# Patient Record
Sex: FEMALE | Race: WHITE | NOT HISPANIC OR LATINO | Employment: OTHER | URBAN - METROPOLITAN AREA
[De-identification: names, ages, dates, MRNs, and addresses within clinical notes are randomized per-mention and may not be internally consistent; named-entity substitution may affect disease eponyms.]

---

## 2017-01-16 ENCOUNTER — APPOINTMENT (OUTPATIENT)
Dept: CARDIOLOGY | Facility: HOSPITAL | Age: 62
End: 2017-01-16
Attending: RADIOLOGY

## 2021-01-27 DIAGNOSIS — I65.21 STENOSIS OF RIGHT CAROTID ARTERY: Primary | ICD-10-CM

## 2021-01-27 RX ORDER — ALIROCUMAB 75 MG/ML
75 INJECTION, SOLUTION SUBCUTANEOUS
Qty: 6 ML | Refills: 3 | Status: SHIPPED | OUTPATIENT
Start: 2021-01-27 | End: 2022-03-25

## 2021-01-27 NOTE — TELEPHONE ENCOUNTER
Dr. Scott:     Last Office Visit: 07/02/2020  Last Labs: 01/09/2020  Next Lab Visit: -   Next Office Visit: -      :  Please book patient for a follow up appointment for labs and office visit.

## 2021-03-03 DIAGNOSIS — I65.21 STENOSIS OF RIGHT CAROTID ARTERY: Primary | ICD-10-CM

## 2021-03-04 RX ORDER — FUROSEMIDE 20 MG/1
TABLET ORAL
Qty: 90 TABLET | Refills: 0 | Status: SHIPPED | OUTPATIENT
Start: 2021-03-04 | End: 2021-04-14

## 2021-03-09 DIAGNOSIS — I10 ESSENTIAL HYPERTENSION, BENIGN: Primary | ICD-10-CM

## 2021-03-09 DIAGNOSIS — E78.2 MIXED HYPERLIPIDEMIA: ICD-10-CM

## 2021-03-09 RX ORDER — AMLODIPINE BESYLATE 5 MG/1
5 TABLET ORAL DAILY
Qty: 90 TABLET | Refills: 0 | Status: SHIPPED | OUTPATIENT
Start: 2021-03-09 | End: 2021-04-14 | Stop reason: SDUPTHER

## 2021-03-09 RX ORDER — ROSUVASTATIN CALCIUM 20 MG/1
20 TABLET, COATED ORAL DAILY
Qty: 90 TABLET | Refills: 0 | Status: SHIPPED | OUTPATIENT
Start: 2021-03-09 | End: 2021-04-14 | Stop reason: SDUPTHER

## 2021-03-09 RX ORDER — ATENOLOL 50 MG/1
50 TABLET ORAL DAILY
Qty: 90 TABLET | Refills: 0 | Status: SHIPPED | OUTPATIENT
Start: 2021-03-09 | End: 2021-04-14 | Stop reason: SDUPTHER

## 2021-04-07 ENCOUNTER — TELEPHONE (OUTPATIENT)
Dept: CARDIOLOGY | Facility: CLINIC | Age: 66
End: 2021-04-07

## 2021-04-07 DIAGNOSIS — I65.23 BILATERAL CAROTID ARTERY STENOSIS: Primary | ICD-10-CM

## 2021-04-07 NOTE — TELEPHONE ENCOUNTER
PATIENT CALLING ASKING ABOUT REFILLS FOR HER LASIK THAT WE ARE DENYING    AND HER BLOOD THINNER    [phone]

## 2021-04-08 RX ORDER — FUROSEMIDE 20 MG/1
20 TABLET ORAL DAILY
Qty: 90 TABLET | Refills: 3 | Status: SHIPPED | OUTPATIENT
Start: 2021-04-08 | End: 2021-09-30

## 2021-04-09 ENCOUNTER — TELEPHONE (OUTPATIENT)
Dept: CARDIOLOGY | Facility: CLINIC | Age: 66
End: 2021-04-09

## 2021-04-14 ENCOUNTER — OFFICE VISIT (OUTPATIENT)
Dept: CARDIOLOGY | Facility: CLINIC | Age: 66
End: 2021-04-14

## 2021-04-14 VITALS
HEIGHT: 63 IN | DIASTOLIC BLOOD PRESSURE: 70 MMHG | SYSTOLIC BLOOD PRESSURE: 132 MMHG | WEIGHT: 180 LBS | BODY MASS INDEX: 31.89 KG/M2

## 2021-04-14 DIAGNOSIS — I65.23 BILATERAL CAROTID ARTERY STENOSIS: ICD-10-CM

## 2021-04-14 DIAGNOSIS — E78.2 MIXED HYPERLIPIDEMIA: ICD-10-CM

## 2021-04-14 DIAGNOSIS — I10 ESSENTIAL HYPERTENSION, BENIGN: ICD-10-CM

## 2021-04-14 PROCEDURE — 99442 PR PHYS/QHP TELEPHONE EVALUATION 11-20 MIN: CPT | Performed by: PHYSICIAN ASSISTANT

## 2021-04-14 RX ORDER — ROSUVASTATIN CALCIUM 20 MG/1
20 TABLET, COATED ORAL DAILY
Qty: 90 TABLET | Refills: 0 | Status: SHIPPED | OUTPATIENT
Start: 2021-04-14 | End: 2021-10-06 | Stop reason: SDUPTHER

## 2021-04-14 RX ORDER — ATENOLOL 50 MG/1
50 TABLET ORAL DAILY
Qty: 90 TABLET | Refills: 0 | Status: SHIPPED | OUTPATIENT
Start: 2021-04-14 | End: 2021-10-06 | Stop reason: SDUPTHER

## 2021-04-14 RX ORDER — AMLODIPINE BESYLATE 5 MG/1
5 TABLET ORAL DAILY
Qty: 90 TABLET | Refills: 0 | Status: SHIPPED | OUTPATIENT
Start: 2021-04-14 | End: 2021-09-27 | Stop reason: SDUPTHER

## 2021-04-14 RX ORDER — LISINOPRIL 40 MG/1
40 TABLET ORAL NIGHTLY
Qty: 180 TABLET | Refills: 3 | Status: SHIPPED | OUTPATIENT
Start: 2021-04-14 | End: 2022-05-20

## 2021-04-14 RX ORDER — OMEPRAZOLE 40 MG/1
40 CAPSULE, DELAYED RELEASE ORAL DAILY
COMMUNITY
Start: 2021-03-09

## 2021-04-14 NOTE — PROGRESS NOTES
[facility] [place]  [facility] CARDIOLOGY  [address] [place] [address]  Dept: [phone]  Dept Fax: [phone]    Eloise Cheng  1955    Televisit Note    You have chosen to receive care through a telephone visit. Do you consent to use a telephone visit for your medical care today? Yes    History of Present Illness:  Eloise Chneg is a 65 y.o. female who presents via phone for follow-up for coronary artery disease, hypertension, and hyperlipidemia.  Patient currently taking Xarelto 2.5 mg twice daily and aspirin 81 mg daily.  Patient denies any chest pain, shortness of breath, leg swelling, syncope, or near syncopal episodes.  Patient currently taking atenolol 50 mg daily, Lasix 20 mg daily, and lisinopril 40 mg daily.  Patient reports blood pressure normal 130/70 on average.    Patient taking Crestor 20 mg and Praluent for hyperlipidemia.    Patient reports overall doing well and feeling well.    The following portions of the patient's history were reviewed and updated as appropriate: allergies, current medications, past family history, past medical history, past social history, past surgical history and problem list.    This visit was scheduled as a phone visit to comply with patient safety concerns in accordance with CDC recommendations.  Time spent in discussion with the patient was 20 minutes.     Medications  amLODIPine  aspirin  atenolol  baclofen  furosemide  lisinopril  Nitrostat sublingual tablet  omeprazole  Praluent solution auto-injector  Rivaroxaban  rosuvastatin    Subjective  Allergies   Allergen Reactions   • Cephalexin GI Intolerance   • Codeine GI Intolerance   • Prednisone GI Intolerance   • Tape Other (See Comments)     Paper tape / water welps        Past Medical History:   Diagnosis Date   • Abdominal aortic aneurysm (AAA) (CMS/[facility]) 2010   • Cancer (CMS/[facility]) 1994    cervix   • Carotid artery stenosis    • Chest pain    • Chronic back pain    • Coarctation  of infrarenal abdominal aorta    • Coronary artery disease    • Depression    • Diabetes (CMS/HCC)    • Disease of artery (CMS/HCC)    • Disease of thyroid gland    • Dyslipidemia    • GERD (gastroesophageal reflux disease)    • Hepatic steatosis    • Hiatal hernia    • Hyperlipidemia    • Hypertension    • Obesity        Past Surgical History:   Procedure Laterality Date   • AORTAGRAM  02/2014   • ARTERIAL ANEURYSM REPAIR  2014    AAA endograft   • BREAST MASS EXCISION  2003   • CAROTID ENDARTERECTOMY Right 07/2012   • COLONOSCOPY N/A    • OR TCAT IV STENT CRV CRTD ART EMBOLIC PROTECJ N/A 6/16/2016    Left Carotid Stent;  Surgeon: Tony Fry MD;  Location: [facility]   • SHOULDER SURGERY      clavicular fx repair   • TONSILLECTOMY  1966   • TUBAL ABDOMINAL LIGATION Bilateral        Family History   Problem Relation Age of Onset   • Coronary artery disease Mother 42   • Hypertension Father    • Coronary artery disease Father    • Hypertension Brother    • Coronary artery disease Brother         CABG   • Diabetes Other    • Suicidality Other         Social History     Socioeconomic History   • Marital status:      Spouse name: Not on file   • Number of children: Not on file   • Years of education: Not on file   • Highest education level: Not on file   Tobacco Use   • Smoking status: Former Smoker   • Smokeless tobacco: Never Used   • Tobacco comment: quit 8 years ago   Substance and Sexual Activity   • Alcohol use: No   • Drug use: No       Cardiovascular Procedures  CATH LAB:  Cath ([facility] ..patent coronaries.. unilateral carotid artery disease which is favorable for carotid  endarterectomy.. normal left ventricular systolic function) - 7/20/2012  CV/SURGERY:  CV Surgery (right CEA) - 7/2012  STRESS TESTS:  MPI (Nuclear stress study was clinically and electrically negative, scintigraphically equivocal with an apical fixed  defect which was worse at rest as well as an inferior perfusion defect at  "rest only. Those were likely artifactual.  There were no high risk features.) - 9/24/2015  VASCULAR:  Carotid Duplex (Carotids CVA: 3/11/2019: Chronic occlusion of the right internal carotid artery. The left carotid  is patent at the site of previous stent placement.)  OTHERS:  Abdominal ultrasound (Abdominal ultrasound That colon 1/2/2019: Mild ectasia of the abdominal aorta. No  significant aneurysm.) - 1/2/2019    Objective  Vitals:    04/14/21 1035   BP: 132/70   Weight: 81.6 kg (180 lb)   Height: 160 cm (63\")   PainSc: 0-No pain     Body mass index is 31.89 kg/m².    Assessment  Diagnoses and all orders for this visit:    1. Essential hypertension, benign  -     amLODIPine (NORVASC) 5 MG tablet; Take 1 tablet by mouth Daily.  Dispense: 90 tablet; Refill: 0  -     atenolol (TENORMIN) 50 MG tablet; Take 1 tablet by mouth Daily.  Dispense: 90 tablet; Refill: 0  -     lisinopril (PRINIVIL,ZESTRIL) 40 MG tablet; Take 1 tablet by mouth Every Night.  Dispense: 180 tablet; Refill: 3  -     Comprehensive Metabolic Panel  -     CBC & Differential    2. Mixed hyperlipidemia  -     rosuvastatin (CRESTOR) 20 MG tablet; Take 1 tablet by mouth Daily.  Dispense: 90 tablet; Refill: 0  -     Lipid Panel    3. Bilateral carotid artery stenosis  -     Rivaroxaban (Xarelto) 2.5 MG tablet; Take 1 tablet by mouth 2 (Two) Times a Day.  Dispense: 180 tablet; Refill: 3        Plan    Diagnoses and all orders for this visit:    1. Essential hypertension, benign- well controlled, refilled meds, cont current tx, cont to monitor.    2. Mixed hyperlipidemia- refilled med, repeat lipids.    3. Bilateral carotid artery stenosis- pt has stent to L carotid. Cont to monitor.      Return in about 6 months (around 10/14/2021).    Antonio Zaragoza PA-C  04/14/2021  "

## 2021-04-14 NOTE — PATIENT INSTRUCTIONS
"Hypertension, Adult  High blood pressure (hypertension) is when the force of blood pumping through the arteries is too strong. The arteries are the blood vessels that carry blood from the heart throughout the body. Hypertension forces the heart to work harder to pump blood and may cause arteries to become narrow or stiff. Untreated or uncontrolled hypertension can cause a heart attack, heart failure, a stroke, kidney disease, and other problems.  A blood pressure reading consists of a higher number over a lower number. Ideally, your blood pressure should be below 120/80. The first (\"top\") number is called the systolic pressure. It is a measure of the pressure in your arteries as your heart beats. The second (\"bottom\") number is called the diastolic pressure. It is a measure of the pressure in your arteries as the heart relaxes.  What are the causes?  The exact cause of this condition is not known. There are some conditions that result in or are related to high blood pressure.  What increases the risk?  Some risk factors for high blood pressure are under your control. The following factors may make you more likely to develop this condition:  · Smoking.  · Having type 2 diabetes mellitus, high cholesterol, or both.  · Not getting enough exercise or physical activity.  · Being overweight.  · Having too much fat, sugar, calories, or salt (sodium) in your diet.  · Drinking too much alcohol.  Some risk factors for high blood pressure may be difficult or impossible to change. Some of these factors include:  · Having chronic kidney disease.  · Having a family history of high blood pressure.  · Age. Risk increases with age.  · Race. You may be at higher risk if you are .  · Gender. Men are at higher risk than women before age 45. After age 65, women are at higher risk than men.  · Having obstructive sleep apnea.  · Stress.  What are the signs or symptoms?  High blood pressure may not cause symptoms. Very high " blood pressure (hypertensive crisis) may cause:  · Headache.  · Anxiety.  · Shortness of breath.  · Nosebleed.  · Nausea and vomiting.  · Vision changes.  · Severe chest pain.  · Seizures.  How is this diagnosed?  This condition is diagnosed by measuring your blood pressure while you are seated, with your arm resting on a flat surface, your legs uncrossed, and your feet flat on the floor. The cuff of the blood pressure monitor will be placed directly against the skin of your upper arm at the level of your heart. It should be measured at least twice using the same arm. Certain conditions can cause a difference in blood pressure between your right and left arms.  Certain factors can cause blood pressure readings to be lower or higher than normal for a short period of time:  · When your blood pressure is higher when you are in a health care provider's office than when you are at home, this is called white coat hypertension. Most people with this condition do not need medicines.  · When your blood pressure is higher at home than when you are in a health care provider's office, this is called masked hypertension. Most people with this condition may need medicines to control blood pressure.  If you have a high blood pressure reading during one visit or you have normal blood pressure with other risk factors, you may be asked to:  · Return on a different day to have your blood pressure checked again.  · Monitor your blood pressure at home for 1 week or longer.  If you are diagnosed with hypertension, you may have other blood or imaging tests to help your health care provider understand your overall risk for other conditions.  How is this treated?  This condition is treated by making healthy lifestyle changes, such as eating healthy foods, exercising more, and reducing your alcohol intake. Your health care provider may prescribe medicine if lifestyle changes are not enough to get your blood pressure under control, and  if:  · Your systolic blood pressure is above 130.  · Your diastolic blood pressure is above 80.  Your personal target blood pressure may vary depending on your medical conditions, your age, and other factors.  Follow these instructions at home:  Eating and drinking    · Eat a diet that is high in fiber and potassium, and low in sodium, added sugar, and fat. An example eating plan is called the DASH (Dietary Approaches to Stop Hypertension) diet. To eat this way:  ? Eat plenty of fresh fruits and vegetables. Try to fill one half of your plate at each meal with fruits and vegetables.  ? Eat whole grains, such as whole-wheat pasta, brown rice, or whole-grain bread. Fill about one fourth of your plate with whole grains.  ? Eat or drink low-fat dairy products, such as skim milk or low-fat yogurt.  ? Avoid fatty cuts of meat, processed or cured meats, and poultry with skin. Fill about one fourth of your plate with lean proteins, such as fish, chicken without skin, beans, eggs, or tofu.  ? Avoid pre-made and processed foods. These tend to be higher in sodium, added sugar, and fat.  · Reduce your daily sodium intake. Most people with hypertension should eat less than 1,500 mg of sodium a day.  · Do not drink alcohol if:  ? Your health care provider tells you not to drink.  ? You are pregnant, may be pregnant, or are planning to become pregnant.  · If you drink alcohol:  ? Limit how much you use to:  § 0-1 drink a day for women.  § 0-2 drinks a day for men.  ? Be aware of how much alcohol is in your drink. In the [place] one drink equals one 12 oz bottle of beer (355 mL), one 5 oz glass of wine (148 mL), or one 1½ oz glass of hard liquor (44 mL).  Lifestyle    · Work with your health care provider to maintain a healthy body weight or to lose weight. Ask what an ideal weight is for you.  · Get at least 30 minutes of exercise most days of the week. Activities may include walking, swimming, or biking.  · Include exercise to  strengthen your muscles (resistance exercise), such as Pilates or lifting weights, as part of your weekly exercise routine. Try to do these types of exercises for 30 minutes at least 3 days a week.  · Do not use any products that contain nicotine or tobacco, such as cigarettes, e-cigarettes, and chewing tobacco. If you need help quitting, ask your health care provider.  · Monitor your blood pressure at home as told by your health care provider.  · Keep all follow-up visits as told by your health care provider. This is important.  Medicines  · Take over-the-counter and prescription medicines only as told by your health care provider. Follow directions carefully. Blood pressure medicines must be taken as prescribed.  · Do not skip doses of blood pressure medicine. Doing this puts you at risk for problems and can make the medicine less effective.  · Ask your health care provider about side effects or reactions to medicines that you should watch for.  Contact a health care provider if you:  · Think you are having a reaction to a medicine you are taking.  · Have headaches that keep coming back (recurring).  · Feel dizzy.  · Have swelling in your ankles.  · Have trouble with your vision.  Get help right away if you:  · Develop a severe headache or confusion.  · Have unusual weakness or numbness.  · Feel faint.  · Have severe pain in your chest or abdomen.  · Vomit repeatedly.  · Have trouble breathing.  Summary  · Hypertension is when the force of blood pumping through your arteries is too strong. If this condition is not controlled, it may put you at risk for serious complications.  · Your personal target blood pressure may vary depending on your medical conditions, your age, and other factors. For most people, a normal blood pressure is less than 120/80.  · Hypertension is treated with lifestyle changes, medicines, or a combination of both. Lifestyle changes include losing weight, eating a healthy, low-sodium diet,  exercising more, and limiting alcohol.  This information is not intended to replace advice given to you by your health care provider. Make sure you discuss any questions you have with your health care provider.  Document Revised: 08/28/2019 Document Reviewed: 08/28/2019  Elsevier Patient Education © 2021 [employer]

## 2021-09-27 ENCOUNTER — TELEPHONE (OUTPATIENT)
Dept: CARDIOLOGY | Facility: CLINIC | Age: 66
End: 2021-09-27

## 2021-09-27 DIAGNOSIS — I10 ESSENTIAL HYPERTENSION, BENIGN: ICD-10-CM

## 2021-09-27 RX ORDER — AMLODIPINE BESYLATE 5 MG/1
5 TABLET ORAL DAILY
Qty: 30 TABLET | Refills: 0 | Status: SHIPPED | OUTPATIENT
Start: 2021-09-27 | End: 2021-09-30

## 2021-09-27 NOTE — TELEPHONE ENCOUNTER
Please advise...    Was in hosp for covid and they took her off her blood pressure      What should she do her heart rate has been jumping

## 2021-09-27 NOTE — TELEPHONE ENCOUNTER
Was in hosp for covid and they took her off her blood pressure     What should she do her heart rate has been jumping

## 2021-09-28 ENCOUNTER — TELEPHONE (OUTPATIENT)
Dept: CARDIOLOGY | Facility: CLINIC | Age: 66
End: 2021-09-28

## 2021-09-28 NOTE — TELEPHONE ENCOUNTER
Pt says he HR earlier was 107 but she was moving around, says she sat down and her HR went down to 88 she also says her bp has been around 112//60

## 2021-09-30 ENCOUNTER — OFFICE VISIT (OUTPATIENT)
Dept: CARDIOLOGY | Facility: CLINIC | Age: 66
End: 2021-09-30

## 2021-09-30 VITALS
RESPIRATION RATE: 15 BRPM | WEIGHT: 210 LBS | HEART RATE: 53 BPM | SYSTOLIC BLOOD PRESSURE: 96 MMHG | BODY MASS INDEX: 37.21 KG/M2 | OXYGEN SATURATION: 95 % | DIASTOLIC BLOOD PRESSURE: 64 MMHG | HEIGHT: 63 IN | TEMPERATURE: 97.1 F

## 2021-09-30 DIAGNOSIS — E78.5 HYPERLIPIDEMIA LDL GOAL <70: ICD-10-CM

## 2021-09-30 DIAGNOSIS — I10 ESSENTIAL HYPERTENSION: ICD-10-CM

## 2021-09-30 DIAGNOSIS — I65.23 CAROTID STENOSIS, BILATERAL: Primary | ICD-10-CM

## 2021-09-30 PROCEDURE — 99214 OFFICE O/P EST MOD 30 MIN: CPT | Performed by: INTERNAL MEDICINE

## 2021-09-30 NOTE — PROGRESS NOTES
[facility] [place]  [facility] CARDIOLOGY  [address] [place] [address]  Dept: [phone]  Dept Fax: [phone]    Eloise Cheng  1955    Follow Up Office Visit Note    History of Present Illness:  Eloise Cheng is a 66 y.o. female who presents to the clinic for Follow-up CAD- Mild disease by cath , no chest pain    The following portions of the patient's history were reviewed and updated as appropriate: allergies, current medications, past family history, past medical history, past social history, past surgical history and problem list.    Medications:  aspirin  atenolol  baclofen  lisinopril  Nitrostat sublingual tablet  omeprazole  Praluent solution auto-injector  Rivaroxaban  rosuvastatin    Subjective  Allergies   Allergen Reactions   • Cephalexin GI Intolerance   • Codeine GI Intolerance   • Prednisone GI Intolerance   • Tape Other (See Comments)     Paper tape / water welps        Past Medical History:   Diagnosis Date   • Abdominal aortic aneurysm (AAA) (CMS/HCC) 2010   • Cancer (CMS/[facility]) 1994    cervix   • Carotid artery stenosis    • Chest pain    • Chronic back pain    • Coarctation of infrarenal abdominal aorta    • Coronary artery disease    • Depression    • Diabetes (CMS/HCC)    • Disease of artery (CMS/HCC)    • Disease of thyroid gland    • Dyslipidemia    • GERD (gastroesophageal reflux disease)    • Hepatic steatosis    • Hiatal hernia    • Hyperlipidemia    • Hypertension    • Obesity        Past Surgical History:   Procedure Laterality Date   • AORTAGRAM  02/2014   • ARTERIAL ANEURYSM REPAIR  2014    AAA endograft   • BREAST MASS EXCISION  2003   • CAROTID ENDARTERECTOMY Right 07/2012   • COLONOSCOPY N/A    • [place] TCAT IV STENT CRV CRTD ART EMBOLIC PROTECJ N/A 6/16/2016    Left Carotid Stent;  Surgeon: Tony Fry MD;  Location: [facility]   • SHOULDER SURGERY      clavicular fx repair   • TONSILLECTOMY  1966   • TUBAL ABDOMINAL LIGATION Bilateral   "      Family History   Problem Relation Age of Onset   • Coronary artery disease Mother 42   • Hypertension Father    • Coronary artery disease Father    • Hypertension Brother    • Coronary artery disease Brother         CABG   • Diabetes Other    • Suicidality Other         Social History     Socioeconomic History   • Marital status:      Spouse name: Not on file   • Number of children: Not on file   • Years of education: Not on file   • Highest education level: Not on file   Tobacco Use   • Smoking status: Former Smoker   • Smokeless tobacco: Never Used   • Tobacco comment: quit 8 years ago   Substance and Sexual Activity   • Alcohol use: No   • Drug use: No       Review of Systems   Constitutional: Negative.    HENT: Negative.    Respiratory: Negative.    Cardiovascular: Negative.    Endocrine: Negative.    Genitourinary: Negative.    Musculoskeletal: Negative.    Skin: Negative.    Allergic/Immunologic: Negative.    Neurological: Negative.    Hematological: Negative.    Psychiatric/Behavioral: Negative.    All other systems reviewed and are negative.      Cardiovascular Procedures    ECHO/MUGA:   STRESS TESTS:   CARDIAC CATH:   DEVICES:   HOLTER:   CT/MRI:   VASCULAR:   CARDIOTHORACIC:     Objective  Vitals:    09/30/21 0944   BP: 96/64   BP Location: Left arm   Patient Position: Sitting   Cuff Size: Large Adult   Pulse: 53   Resp: 15   Temp: 97.1 °F (36.2 °C)   TempSrc: Infrared   SpO2: 95%   Weight: 95.3 kg (210 lb)   Height: 160 cm (63\")   PainSc: 0-No pain     Body mass index is 37.2 kg/m².     Physical Exam  Constitutional:       Appearance: Healthy appearance. Not in distress.   Neck:      Vascular: No JVR. JVD normal.   Pulmonary:      Effort: Pulmonary effort is normal.      Breath sounds: Normal breath sounds. No wheezing. No rhonchi. No rales.   Chest:      Chest wall: Not tender to palpatation.   Cardiovascular:      PMI at left midclavicular line. Normal rate. Regular rhythm. Normal S1. " Normal S2.      Murmurs: There is no murmur.      No gallop. No click. No rub.   Pulses:     Intact distal pulses.   Edema:     Peripheral edema absent.   Abdominal:      General: Bowel sounds are normal.      Palpations: Abdomen is soft.      Tenderness: There is no abdominal tenderness.   Musculoskeletal: Normal range of motion.         General: No tenderness. Skin:     General: Skin is warm and dry.   Neurological:      General: No focal deficit present.      Mental Status: Alert and oriented to person, place and time.          Diagnostic Data  Procedures    Assessment and Plan  Diagnoses and all orders for this visit:    Carotid stenosis, bilateral- patient has prior right carotid endarterectomy      And then has same side 100% occlusion., she is s/p stent to LICA/ 2016.  -     CBC & Differential  -     Comprehensive Metabolic Panel    Essential hypertension- BP has been running low 125.8-0 on Atenolol 50 mg, Lisinopril 40 mg and the amlodipine was d.,c by PCP recently  plus Lasix, will get lab , she has had COVID early this month  -     CBC & Differential  -     Comprehensive Metabolic Panel    Hyperlipidemia LDL goal <70- On Crestor Praluent   -     CBC & Differential  -     Comprehensive Metabolic Panel         No follow-ups on file.    Benson Scott MD  09/30/2021

## 2021-10-01 ENCOUNTER — TELEPHONE (OUTPATIENT)
Dept: CARDIOLOGY | Facility: CLINIC | Age: 66
End: 2021-10-01

## 2021-10-01 LAB
ALBUMIN SERPL-MCNC: 4.3 G/DL (ref 3.8–4.8)
ALBUMIN/GLOB SERPL: 2 {RATIO} (ref 1.2–2.2)
ALP SERPL-CCNC: 79 IU/L (ref 44–121)
ALT SERPL-CCNC: 25 IU/L (ref 0–32)
AST SERPL-CCNC: 25 IU/L (ref 0–40)
BASOPHILS # BLD AUTO: 0.1 X10E3/UL (ref 0–0.2)
BASOPHILS NFR BLD AUTO: 1 %
BILIRUB SERPL-MCNC: 0.5 MG/DL (ref 0–1.2)
BUN SERPL-MCNC: 30 MG/DL (ref 8–27)
BUN/CREAT SERPL: 25 (ref 12–28)
CALCIUM SERPL-MCNC: 9.8 MG/DL (ref 8.7–10.3)
CHLORIDE SERPL-SCNC: 99 MMOL/L (ref 96–106)
CO2 SERPL-SCNC: 23 MMOL/L (ref 20–29)
CREAT SERPL-MCNC: 1.22 MG/DL (ref 0.57–1)
EOSINOPHIL # BLD AUTO: 0.2 X10E3/UL (ref 0–0.4)
EOSINOPHIL NFR BLD AUTO: 2 %
ERYTHROCYTE [DISTWIDTH] IN BLOOD BY AUTOMATED COUNT: 15.8 % (ref 11.7–15.4)
GLOBULIN SER CALC-MCNC: 2.1 G/DL (ref 1.5–4.5)
GLUCOSE SERPL-MCNC: 115 MG/DL (ref 65–99)
HCT VFR BLD AUTO: 43.3 % (ref 34–46.6)
HGB BLD-MCNC: 13.3 G/DL (ref 11.1–15.9)
IMM GRANULOCYTES # BLD AUTO: 0 X10E3/UL (ref 0–0.1)
IMM GRANULOCYTES NFR BLD AUTO: 0 %
LYMPHOCYTES # BLD AUTO: 1.1 X10E3/UL (ref 0.7–3.1)
LYMPHOCYTES NFR BLD AUTO: 11 %
MCH RBC QN AUTO: 24.9 PG (ref 26.6–33)
MCHC RBC AUTO-ENTMCNC: 30.7 G/DL (ref 31.5–35.7)
MCV RBC AUTO: 81 FL (ref 79–97)
MONOCYTES # BLD AUTO: 0.8 X10E3/UL (ref 0.1–0.9)
MONOCYTES NFR BLD AUTO: 8 %
NEUTROPHILS # BLD AUTO: 7.8 X10E3/UL (ref 1.4–7)
NEUTROPHILS NFR BLD AUTO: 78 %
PLATELET # BLD AUTO: 455 X10E3/UL (ref 150–450)
POTASSIUM SERPL-SCNC: 6 MMOL/L (ref 3.5–5.2)
PROT SERPL-MCNC: 6.4 G/DL (ref 6–8.5)
RBC # BLD AUTO: 5.34 X10E6/UL (ref 3.77–5.28)
SODIUM SERPL-SCNC: 136 MMOL/L (ref 134–144)
WBC # BLD AUTO: 10 X10E3/UL (ref 3.4–10.8)

## 2021-10-01 NOTE — TELEPHONE ENCOUNTER
----- Message from Benson Scott MD sent at 10/1/2021  1:08 PM EDT -----  The creatinine is elevated, please stop the Lasix and drink extra fluids, the reason why your BP was low is dehydration, from water pill

## 2021-10-06 ENCOUNTER — TELEPHONE (OUTPATIENT)
Dept: CARDIOLOGY | Facility: CLINIC | Age: 66
End: 2021-10-06

## 2021-10-06 DIAGNOSIS — I10 ESSENTIAL HYPERTENSION, BENIGN: ICD-10-CM

## 2021-10-06 DIAGNOSIS — E78.2 MIXED HYPERLIPIDEMIA: ICD-10-CM

## 2021-10-06 RX ORDER — ATENOLOL 50 MG/1
50 TABLET ORAL DAILY
Qty: 90 TABLET | Refills: 3 | Status: SHIPPED | OUTPATIENT
Start: 2021-10-06 | End: 2022-07-14 | Stop reason: SDUPTHER

## 2021-10-06 RX ORDER — ROSUVASTATIN CALCIUM 20 MG/1
20 TABLET, COATED ORAL DAILY
Qty: 90 TABLET | Refills: 3 | Status: SHIPPED | OUTPATIENT
Start: 2021-10-06 | End: 2022-07-14 | Stop reason: SDUPTHER

## 2021-10-12 ENCOUNTER — TELEPHONE (OUTPATIENT)
Dept: CARDIOLOGY | Facility: CLINIC | Age: 66
End: 2021-10-12

## 2021-10-12 DIAGNOSIS — I10 ESSENTIAL HYPERTENSION, BENIGN: Primary | ICD-10-CM

## 2021-10-13 ENCOUNTER — TELEPHONE (OUTPATIENT)
Dept: CARDIOLOGY | Facility: CLINIC | Age: 66
End: 2021-10-13

## 2021-10-13 NOTE — TELEPHONE ENCOUNTER
Called pt to let her know her lab results from today are normal, she said her bp is running better and shes not feeling dizzy anymore.

## 2022-03-25 DIAGNOSIS — I65.21 STENOSIS OF RIGHT CAROTID ARTERY: ICD-10-CM

## 2022-03-25 RX ORDER — ALIROCUMAB 75 MG/ML
INJECTION, SOLUTION SUBCUTANEOUS
Qty: 6 ML | Refills: 3 | Status: SHIPPED | OUTPATIENT
Start: 2022-03-25 | End: 2023-01-05

## 2022-04-25 ENCOUNTER — OFFICE VISIT (OUTPATIENT)
Dept: CARDIOLOGY | Facility: CLINIC | Age: 67
End: 2022-04-25

## 2022-04-25 VITALS
OXYGEN SATURATION: 99 % | WEIGHT: 218 LBS | RESPIRATION RATE: 18 BRPM | BODY MASS INDEX: 38.62 KG/M2 | HEIGHT: 63 IN | TEMPERATURE: 98 F | DIASTOLIC BLOOD PRESSURE: 84 MMHG | HEART RATE: 90 BPM | SYSTOLIC BLOOD PRESSURE: 152 MMHG

## 2022-04-25 DIAGNOSIS — E78.5 HYPERLIPIDEMIA LDL GOAL <70: ICD-10-CM

## 2022-04-25 DIAGNOSIS — R06.02 SHORTNESS OF BREATH: ICD-10-CM

## 2022-04-25 DIAGNOSIS — I10 ESSENTIAL HYPERTENSION: Primary | ICD-10-CM

## 2022-04-25 DIAGNOSIS — I65.23 CAROTID STENOSIS, BILATERAL: ICD-10-CM

## 2022-04-25 PROCEDURE — 99214 OFFICE O/P EST MOD 30 MIN: CPT | Performed by: INTERNAL MEDICINE

## 2022-04-25 PROCEDURE — 93000 ELECTROCARDIOGRAM COMPLETE: CPT | Performed by: INTERNAL MEDICINE

## 2022-04-25 RX ORDER — AMLODIPINE BESYLATE 5 MG/1
5 TABLET ORAL DAILY
Qty: 90 TABLET | Refills: 3 | Status: SHIPPED | OUTPATIENT
Start: 2022-04-25 | End: 2022-07-14 | Stop reason: SDDI

## 2022-04-25 RX ORDER — FUROSEMIDE 20 MG/1
TABLET ORAL
COMMUNITY
Start: 2022-04-04 | End: 2022-07-05

## 2022-04-25 NOTE — PROGRESS NOTES
[facility] [place]  [facility] CARDIOLOGY  [address] [place] [address]  Dept: [phone]  Dept Fax: [phone]    Eloise Cheng  1955    Follow Up Office Visit Note    History of Present Illness:  Eloise Cheng is a 66 y.o. female who presents to the clinic for Follow-up and Shortness of Breath. -- She has Hypertension, mild CAD, hyperlipidemia and carotid stenosis,  She has noticed some SOB for the last 2 days, her Bp is elevated 160,80 on Atenolol 50 mg Lisinopril 40 mg, has gained 8 pounds, since last visit also she used to take Amlodipine before but her PCP stopped when she has COVID and her BP was low, will restart 5 mg Amlodipine, her EKG  Sinus rhythm Hr 61, no changes ,will restart Amlodipine 5mg, will get some lab BNP,     The following portions of the patient's history were reviewed and updated as appropriate: allergies, current medications, past family history, past medical history, past social history, past surgical history and problem list.    Medications:  amLODIPine  aspirin  atenolol  furosemide  lisinopril  Nitrostat sublingual tablet  omeprazole  Praluent solution auto-injector  Rivaroxaban  rosuvastatin    Subjective  Allergies   Allergen Reactions   • Tape Other (See Comments)     Paper tape / water welps   • Cephalexin GI Intolerance   • Codeine GI Intolerance   • Prednisone GI Intolerance        Past Medical History:   Diagnosis Date   • Abdominal aortic aneurysm (AAA) ([facility]) 2010   • Cancer ([facility]) 1994    cervix   • Carotid artery stenosis    • Chest pain    • Chronic back pain    • Coarctation of infrarenal abdominal aorta    • Coronary artery disease    • Depression    • Diabetes ([facility])    • Disease of artery ([facility])    • Disease of thyroid gland    • Dyslipidemia    • GERD (gastroesophageal reflux disease)    • Hepatic steatosis    • Hiatal hernia    • Hyperlipidemia    • Hypertension    • Obesity        Past Surgical History:   Procedure Laterality Date  "  • AORTAGRAM  2014   • ARTERIAL ANEURYSM REPAIR      AAA endograft   • BREAST MASS EXCISION     • CAROTID ENDARTERECTOMY Right 2012   • COLONOSCOPY N/A    • CT TCAT IV STENT CRV CRTD ART EMBOLIC PROTECJ N/A 2016    Left Carotid Stent;  Surgeon: Tony Fry MD;  Location: [facility]   • SHOULDER SURGERY      clavicular fx repair   • TONSILLECTOMY     • TUBAL ABDOMINAL LIGATION Bilateral        Family History   Problem Relation Age of Onset   • Coronary artery disease Mother 42   • Hypertension Father    • Coronary artery disease Father    • Hypertension Brother    • Coronary artery disease Brother         CABG   • Diabetes Other    • Suicidality Other         Social History     Socioeconomic History   • Marital status:    Tobacco Use   • Smoking status: Former Smoker     Packs/day: 1.00     Types: Cigarettes     Quit date:      Years since quittin.3   • Smokeless tobacco: Never Used   • Tobacco comment: quit 8 years ago   Vaping Use   • Vaping Use: Never used   Substance and Sexual Activity   • Alcohol use: No   • Drug use: No   • Sexual activity: Defer       Review of Systems   Constitutional: Negative.    HENT: Negative.    Respiratory: Positive for shortness of breath.    Cardiovascular: Negative.    Endocrine: Negative.    Genitourinary: Negative.    Musculoskeletal: Negative.    Skin: Negative.    Allergic/Immunologic: Negative.    Neurological: Negative.    Hematological: Negative.    Psychiatric/Behavioral: Negative.        Cardiovascular Procedures    ECHO/MUGA:   STRESS TESTS:   CARDIAC CATH:   DEVICES:   HOLTER:   CT/MRI:   VASCULAR:   CARDIOTHORACIC:     Objective  Vitals:    22 1147   BP: 152/84   BP Location: Left arm   Patient Position: Sitting   Cuff Size: Adult   Pulse: 90   Resp: 18   Temp: 98 °F (36.7 °C)   TempSrc: Infrared   SpO2: 99%   Weight: 98.9 kg (218 lb)   Height: 160 cm (63\")   PainSc: 0-No pain     Body mass index is 38.62 kg/m².     Physical " Exam  Vitals reviewed.   Constitutional:       Appearance: Healthy appearance. Not in distress.   Neck:      Vascular: No JVR. JVD normal.   Pulmonary:      Effort: Pulmonary effort is normal.      Breath sounds: Normal breath sounds. No wheezing. No rhonchi. No rales.   Chest:      Chest wall: Not tender to palpatation.   Cardiovascular:      PMI at left midclavicular line. Normal rate. Regular rhythm. Normal S1. Normal S2.      Murmurs: There is no murmur.      No gallop. No click. No rub.   Pulses:     Intact distal pulses.   Edema:     Peripheral edema absent.   Abdominal:      General: Bowel sounds are normal.      Palpations: Abdomen is soft.      Tenderness: There is no abdominal tenderness.   Musculoskeletal: Normal range of motion.         General: No tenderness. Skin:     General: Skin is warm and dry.   Neurological:      General: No focal deficit present.      Mental Status: Alert and oriented to person, place and time.          Diagnostic Data    ECG 12 Lead    Date/Time: 4/25/2022 1:05 PM  Performed by: Benson Scott MD  Authorized by: Benson Scott MD   Comparison: compared with previous ECG from 4/12/2021  Similar to previous ECG  Rhythm: sinus rhythm  Rate: normal  BPM: 61  QRS axis: normal  Other findings: low voltage    Clinical impression: normal ECG            Assessment and Plan  Diagnoses and all orders for this visit:    Essential hypertension-The BP is elebated 160.80, will add Amlodipine 5mg , keep lisinopril 40 mg and also Atenolol 50 mg, will see her back in 1 month  -     CBC & Differential  -     Comprehensive Metabolic Panel  -     CK    Carotid stenosis, bilateral- She has 100% RIGht carotid and has stent in LICA., she has had prior right carotid endartectomy in 2012 but later got 100% Occludion, on Asa   -     CBC & Differential    Hyperlipidemia LDL goal <70- On Repatha and also Crestor   -     High Sensitivity CRP  -     Lipid Panel    Shortness of breath-  Possible related to hypertension, will start Amlodipine back 5mg, keep atenolol 5-0 mg and also Lisinopril 40 mg  -     proBNP    Other orders  -     furosemide (LASIX) 20 MG tablet  -     amLODIPine (NORVASC) 5 MG tablet; Take 1 tablet by mouth Daily.         No follow-ups on file.    Benson Scott MD  04/25/2022

## 2022-04-26 ENCOUNTER — TELEPHONE (OUTPATIENT)
Dept: CARDIOLOGY | Facility: CLINIC | Age: 67
End: 2022-04-26

## 2022-04-26 LAB
ALBUMIN SERPL-MCNC: 4.4 G/DL (ref 3.8–4.8)
ALBUMIN/GLOB SERPL: 2.1 {RATIO} (ref 1.2–2.2)
ALP SERPL-CCNC: 72 IU/L (ref 44–121)
ALT SERPL-CCNC: 16 IU/L (ref 0–32)
AST SERPL-CCNC: 19 IU/L (ref 0–40)
BASOPHILS # BLD AUTO: 0.1 X10E3/UL (ref 0–0.2)
BASOPHILS NFR BLD AUTO: 1 %
BILIRUB SERPL-MCNC: 0.5 MG/DL (ref 0–1.2)
BUN SERPL-MCNC: 15 MG/DL (ref 8–27)
BUN/CREAT SERPL: 14 (ref 12–28)
CALCIUM SERPL-MCNC: 9.3 MG/DL (ref 8.7–10.3)
CHLORIDE SERPL-SCNC: 105 MMOL/L (ref 96–106)
CHOLEST SERPL-MCNC: 93 MG/DL (ref 100–199)
CK SERPL-CCNC: 54 U/L (ref 32–182)
CO2 SERPL-SCNC: 25 MMOL/L (ref 20–29)
CREAT SERPL-MCNC: 1.04 MG/DL (ref 0.57–1)
CRP SERPL HS-MCNC: 0.95 MG/L (ref 0–3)
EGFRCR SERPLBLD CKD-EPI 2021: 60 ML/MIN/1.73
EOSINOPHIL # BLD AUTO: 0.3 X10E3/UL (ref 0–0.4)
EOSINOPHIL NFR BLD AUTO: 4 %
ERYTHROCYTE [DISTWIDTH] IN BLOOD BY AUTOMATED COUNT: 14.5 % (ref 11.7–15.4)
GLOBULIN SER CALC-MCNC: 2.1 G/DL (ref 1.5–4.5)
GLUCOSE SERPL-MCNC: 114 MG/DL (ref 65–99)
HCT VFR BLD AUTO: 43.3 % (ref 34–46.6)
HDLC SERPL-MCNC: 43 MG/DL
HGB BLD-MCNC: 13.4 G/DL (ref 11.1–15.9)
IMM GRANULOCYTES # BLD AUTO: 0 X10E3/UL (ref 0–0.1)
IMM GRANULOCYTES NFR BLD AUTO: 0 %
LDLC SERPL CALC-MCNC: 27 MG/DL (ref 0–99)
LYMPHOCYTES # BLD AUTO: 1 X10E3/UL (ref 0.7–3.1)
LYMPHOCYTES NFR BLD AUTO: 14 %
MCH RBC QN AUTO: 24.7 PG (ref 26.6–33)
MCHC RBC AUTO-ENTMCNC: 30.9 G/DL (ref 31.5–35.7)
MCV RBC AUTO: 80 FL (ref 79–97)
MONOCYTES # BLD AUTO: 0.4 X10E3/UL (ref 0.1–0.9)
MONOCYTES NFR BLD AUTO: 6 %
NEUTROPHILS # BLD AUTO: 5.1 X10E3/UL (ref 1.4–7)
NEUTROPHILS NFR BLD AUTO: 75 %
PLATELET # BLD AUTO: 408 X10E3/UL (ref 150–450)
POTASSIUM SERPL-SCNC: 5 MMOL/L (ref 3.5–5.2)
PROT SERPL-MCNC: 6.5 G/DL (ref 6–8.5)
RBC # BLD AUTO: 5.43 X10E6/UL (ref 3.77–5.28)
SODIUM SERPL-SCNC: 142 MMOL/L (ref 134–144)
TRIGL SERPL-MCNC: 130 MG/DL (ref 0–149)
VLDLC SERPL CALC-MCNC: 23 MG/DL (ref 5–40)
WBC # BLD AUTO: 6.8 X10E3/UL (ref 3.4–10.8)

## 2022-04-26 RX ORDER — BACLOFEN 10 MG/1
10 TABLET ORAL NIGHTLY
COMMUNITY

## 2022-04-26 NOTE — TELEPHONE ENCOUNTER
----- Message from Benson Scott MD sent at 4/26/2022  1:19 PM EDT -----  This patient needs follow up in 1 month, her lipids and lab are good

## 2022-04-26 NOTE — TELEPHONE ENCOUNTER
Spoke with Ms. Cheng and advised her that her lipids and labs looked good.  Dr. Scott would like you to follow up in 1 month and patient scheduled for 5/26. Pt verbalized understanding.

## 2022-05-20 DIAGNOSIS — I10 ESSENTIAL HYPERTENSION, BENIGN: ICD-10-CM

## 2022-05-20 RX ORDER — LISINOPRIL 40 MG/1
TABLET ORAL
Qty: 90 TABLET | Refills: 3 | Status: SHIPPED | OUTPATIENT
Start: 2022-05-20 | End: 2022-07-14 | Stop reason: SDUPTHER

## 2022-06-03 DIAGNOSIS — I65.23 BILATERAL CAROTID ARTERY STENOSIS: ICD-10-CM

## 2022-06-03 RX ORDER — RIVAROXABAN 2.5 MG/1
TABLET, FILM COATED ORAL
Qty: 180 TABLET | Refills: 3 | Status: SHIPPED | OUTPATIENT
Start: 2022-06-03 | End: 2022-07-14 | Stop reason: SDUPTHER

## 2022-07-05 DIAGNOSIS — R06.02 SHORTNESS OF BREATH: ICD-10-CM

## 2022-07-05 RX ORDER — FUROSEMIDE 20 MG/1
TABLET ORAL
Qty: 10 TABLET | Refills: 0 | Status: SHIPPED | OUTPATIENT
Start: 2022-07-05 | End: 2022-07-14 | Stop reason: SDUPTHER

## 2022-07-14 ENCOUNTER — OFFICE VISIT (OUTPATIENT)
Dept: CARDIOLOGY | Facility: CLINIC | Age: 67
End: 2022-07-14

## 2022-07-14 ENCOUNTER — TELEPHONE (OUTPATIENT)
Dept: CARDIOLOGY | Facility: CLINIC | Age: 67
End: 2022-07-14

## 2022-07-14 VITALS
BODY MASS INDEX: 37.56 KG/M2 | SYSTOLIC BLOOD PRESSURE: 148 MMHG | HEIGHT: 63 IN | DIASTOLIC BLOOD PRESSURE: 76 MMHG | TEMPERATURE: 98 F | OXYGEN SATURATION: 98 % | RESPIRATION RATE: 18 BRPM | HEART RATE: 61 BPM | WEIGHT: 212 LBS

## 2022-07-14 DIAGNOSIS — I73.9 ASYMPTOMATIC PVD (PERIPHERAL VASCULAR DISEASE): ICD-10-CM

## 2022-07-14 DIAGNOSIS — I71.40 AAA (ABDOMINAL AORTIC ANEURYSM) WITHOUT RUPTURE: ICD-10-CM

## 2022-07-14 DIAGNOSIS — R73.9 HYPERGLYCEMIA: ICD-10-CM

## 2022-07-14 DIAGNOSIS — I65.23 CAROTID STENOSIS, BILATERAL: Primary | ICD-10-CM

## 2022-07-14 DIAGNOSIS — R06.02 SHORTNESS OF BREATH: ICD-10-CM

## 2022-07-14 DIAGNOSIS — I10 ESSENTIAL HYPERTENSION: ICD-10-CM

## 2022-07-14 DIAGNOSIS — E78.5 HYPERLIPIDEMIA LDL GOAL <70: ICD-10-CM

## 2022-07-14 PROCEDURE — 99214 OFFICE O/P EST MOD 30 MIN: CPT | Performed by: INTERNAL MEDICINE

## 2022-07-14 RX ORDER — ROSUVASTATIN CALCIUM 20 MG/1
20 TABLET, COATED ORAL NIGHTLY
Qty: 90 TABLET | Refills: 3 | Status: SHIPPED | OUTPATIENT
Start: 2022-07-14 | End: 2023-01-19 | Stop reason: SDUPTHER

## 2022-07-14 RX ORDER — LISINOPRIL 40 MG/1
40 TABLET ORAL
Qty: 90 TABLET | Refills: 3 | Status: SHIPPED | OUTPATIENT
Start: 2022-07-14 | End: 2023-01-19 | Stop reason: SDUPTHER

## 2022-07-14 RX ORDER — ATENOLOL 50 MG/1
50 TABLET ORAL DAILY
Qty: 90 TABLET | Refills: 3 | Status: SHIPPED | OUTPATIENT
Start: 2022-07-14 | End: 2023-01-19 | Stop reason: SDUPTHER

## 2022-07-14 RX ORDER — ASPIRIN 81 MG/1
81 TABLET ORAL NIGHTLY
Qty: 90 TABLET | Refills: 3 | Status: SHIPPED | OUTPATIENT
Start: 2022-07-14

## 2022-07-14 RX ORDER — FUROSEMIDE 20 MG/1
20 TABLET ORAL DAILY
Qty: 90 TABLET | Refills: 3 | Status: SHIPPED | OUTPATIENT
Start: 2022-07-14 | End: 2023-01-19 | Stop reason: SDUPTHER

## 2022-07-14 NOTE — TELEPHONE ENCOUNTER
PC to patient. No answer, Left message for her to return call regarding the records I reviewed from [place]. I would like to speak with her.

## 2022-07-14 NOTE — PROGRESS NOTES
[facility] [place]  [facility] CARDIOLOGY  [address] [place] [address]  Dept: [phone]  Dept Fax: [phone]    Eloise Cheng  1955    Follow Up Office Visit Note    History of Present Illness:  Eloise Cheng is a 66 y.o. female who presents to the clinic for Follow-up. CAD- Mild disease by cath, has also poly vascular disease with carotid stenosis, s/p right carotid endarterectomy and after 2 years has 100% occlusion, she did have also a stent on the LICA in 2016, , she has had also surgery for PVD. She is on Asa and Xarelto 2,5 bid, she denies any chest pain, SOB, claudication,  Or TIA symptoms., , will keep same meds before the next appointment will have a carotid doppler and also an JEMAL    The following portions of the patient's history were reviewed and updated as appropriate: allergies, current medications, past family history, past medical history, past social history, past surgical history and problem list.    Medications:  aspirin  atenolol  baclofen  furosemide  lisinopril  Nitrostat sublingual tablet  omeprazole  Praluent solution auto-injector  Rivaroxaban  rosuvastatin    Subjective  Allergies   Allergen Reactions   • Tape Other (See Comments)     Paper tape / water welps   • Cephalexin GI Intolerance   • Codeine GI Intolerance   • Prednisone GI Intolerance        Past Medical History:   Diagnosis Date   • Abdominal aortic aneurysm (AAA) ([facility]) 2010   • Cancer ([facility]) 1994    cervix   • Carotid artery stenosis    • Chest pain    • Chronic back pain    • Coarctation of infrarenal abdominal aorta    • Coronary artery disease    • Depression    • Diabetes ([facility])    • Disease of artery ([facility])    • Disease of thyroid gland    • Dyslipidemia    • GERD (gastroesophageal reflux disease)    • Hepatic steatosis    • Hiatal hernia    • Hyperlipidemia    • Hypertension    • Obesity        Past Surgical History:   Procedure Laterality Date   • AORTAGRAM  02/2014   • ARTERIAL  "ANEURYSM REPAIR  2014    AAA endograft   • BREAST MASS EXCISION     • CAROTID ENDARTERECTOMY Right 2012   • COLONOSCOPY N/A    • DE TCAT IV STENT CRV CRTD ART EMBOLIC PROTECJ N/A 2016    Left Carotid Stent;  Surgeon: Tony Fry MD;  [facility]   • SHOULDER SURGERY      clavicular fx repair   • TONSILLECTOMY     • TUBAL ABDOMINAL LIGATION Bilateral        Family History   Problem Relation Age of Onset   • Coronary artery disease Mother 42   • Hypertension Father    • Coronary artery disease Father    • Hypertension Brother    • Coronary artery disease Brother         CABG   • Diabetes Other    • Suicidality Other         Social History     Socioeconomic History   • Marital status:    Tobacco Use   • Smoking status: Former Smoker     Packs/day: 1.00     Types: Cigarettes     Quit date:      Years since quittin.5   • Smokeless tobacco: Never Used   • Tobacco comment: quit 8 years ago   Vaping Use   • Vaping Use: Never used   Substance and Sexual Activity   • Alcohol use: No   • Drug use: No   • Sexual activity: Defer       Review of Systems   Constitutional: Negative.    HENT: Negative.    Respiratory: Negative.    Cardiovascular: Negative.    Endocrine: Negative.    Genitourinary: Negative.    Musculoskeletal: Negative.    Skin: Negative.    Allergic/Immunologic: Negative.    Neurological: Negative.    Hematological: Negative.    Psychiatric/Behavioral: Negative.        Cardiovascular Procedures    ECHO/MUGA:   STRESS TESTS:   CARDIAC CATH:   DEVICES:   HOLTER:   CT/MRI:   VASCULAR:   CARDIOTHORACIC:     Objective  Vitals:    22 1057   BP: 148/76   BP Location: Left arm   Patient Position: Sitting   Cuff Size: Adult   Pulse: 61   Resp: 18   Temp: 98 °F (36.7 °C)   TempSrc: Infrared   SpO2: 98%   Weight: 96.2 kg (212 lb)   Height: 160 cm (63\")   PainSc: 0-No pain     Body mass index is 37.55 kg/m².     Physical Exam  Constitutional:       Appearance: Healthy appearance. " Not in distress.   Neck:      Vascular: No JVR. JVD normal.   Pulmonary:      Effort: Pulmonary effort is normal.      Breath sounds: Normal breath sounds. No wheezing. No rhonchi. No rales.   Chest:      Chest wall: Not tender to palpatation.   Cardiovascular:      PMI at left midclavicular line. Normal rate. Regular rhythm. Normal S1. Normal S2.      Murmurs: There is no murmur.      No gallop. No click. No rub.   Pulses:     Intact distal pulses.   Edema:     Peripheral edema absent.   Abdominal:      General: Bowel sounds are normal.      Palpations: Abdomen is soft.      Tenderness: There is no abdominal tenderness.   Musculoskeletal: Normal range of motion.         General: No tenderness. Skin:     General: Skin is warm and dry.   Neurological:      General: No focal deficit present.      Mental Status: Alert and oriented to person, place and time.          Diagnostic Data  Procedures    Assessment and Plan  Diagnoses and all orders for this visit:    Carotid stenosis, bilateral- will get a carotid doppler, she has stent to LICA and the TRIP is 100% occlusion  -     Rivaroxaban (Xarelto) 2.5 MG tablet; Take 1 tablet by mouth 2 (Two) Times a Day.    Essential hypertension- BP is high 160.70, she is on Lisinopril 40 mg, , Atenolol 5 mg, lasix 20 mg, she is not taking the Amlodipine    And she states my BP at home are good, will observe  -     atenolol (TENORMIN) 50 MG tablet; Take 1 tablet by mouth Daily.  -     lisinopril (PRINIVIL,ZESTRIL) 40 MG tablet; Take 1 tablet by mouth every night at bedtime.    Hyperlipidemia LDL goal <70-On Repatha and also Crestor   -     rosuvastatin (CRESTOR) 20 MG tablet; Take 1 tablet by mouth Every Night.    Shortness of breath-- She seems is doing well,   -     furosemide (LASIX) 20 MG tablet; Take 1 tablet by mouth Daily.    Hyperglycemia- Will get a A!C  -     Hemoglobin A1c  PVD - she is s.p surgery , will get an JEMAL, seems doing well   AAA- She is s.p endovascular  surgery  in 2014,     Other orders  -     aspirin (aspirin) 81 MG EC tablet; Take 1 tablet by mouth Every Night.         Return in about 6 months (around 1/14/2023) for Recheck.    Benson Scott MD  07/14/2022

## 2023-01-05 DIAGNOSIS — I65.21 STENOSIS OF RIGHT CAROTID ARTERY: ICD-10-CM

## 2023-01-05 RX ORDER — ALIROCUMAB 75 MG/ML
INJECTION, SOLUTION SUBCUTANEOUS
Qty: 6 ML | Refills: 3 | Status: SHIPPED | OUTPATIENT
Start: 2023-01-05

## 2023-01-19 ENCOUNTER — OFFICE VISIT (OUTPATIENT)
Dept: CARDIOLOGY | Facility: CLINIC | Age: 68
End: 2023-01-19
Payer: MEDICARE

## 2023-01-19 VITALS
HEART RATE: 63 BPM | HEIGHT: 63 IN | TEMPERATURE: 97.2 F | OXYGEN SATURATION: 95 % | WEIGHT: 214 LBS | SYSTOLIC BLOOD PRESSURE: 124 MMHG | DIASTOLIC BLOOD PRESSURE: 62 MMHG | BODY MASS INDEX: 37.92 KG/M2 | RESPIRATION RATE: 18 BRPM

## 2023-01-19 DIAGNOSIS — I25.10 CORONARY ARTERY DISEASE INVOLVING NATIVE CORONARY ARTERY OF NATIVE HEART WITHOUT ANGINA PECTORIS: ICD-10-CM

## 2023-01-19 DIAGNOSIS — I71.43 INFRARENAL ABDOMINAL AORTIC ANEURYSM (AAA) WITHOUT RUPTURE: Primary | ICD-10-CM

## 2023-01-19 DIAGNOSIS — I65.23 CAROTID STENOSIS, BILATERAL: ICD-10-CM

## 2023-01-19 DIAGNOSIS — I10 ESSENTIAL HYPERTENSION: ICD-10-CM

## 2023-01-19 DIAGNOSIS — I73.9 ASYMPTOMATIC PVD (PERIPHERAL VASCULAR DISEASE): ICD-10-CM

## 2023-01-19 DIAGNOSIS — E78.5 HYPERLIPIDEMIA LDL GOAL <70: ICD-10-CM

## 2023-01-19 DIAGNOSIS — R06.02 SHORTNESS OF BREATH: ICD-10-CM

## 2023-01-19 PROCEDURE — 93000 ELECTROCARDIOGRAM COMPLETE: CPT | Performed by: INTERNAL MEDICINE

## 2023-01-19 PROCEDURE — 99214 OFFICE O/P EST MOD 30 MIN: CPT | Performed by: INTERNAL MEDICINE

## 2023-01-19 RX ORDER — FUROSEMIDE 20 MG/1
20 TABLET ORAL DAILY
Qty: 90 TABLET | Refills: 3 | Status: SHIPPED | OUTPATIENT
Start: 2023-01-19

## 2023-01-19 RX ORDER — LISINOPRIL 40 MG/1
40 TABLET ORAL
Qty: 90 TABLET | Refills: 3 | Status: SHIPPED | OUTPATIENT
Start: 2023-01-19

## 2023-01-19 RX ORDER — ATENOLOL 50 MG/1
50 TABLET ORAL DAILY
Qty: 90 TABLET | Refills: 3 | Status: SHIPPED | OUTPATIENT
Start: 2023-01-19

## 2023-01-19 RX ORDER — ROSUVASTATIN CALCIUM 20 MG/1
20 TABLET, COATED ORAL NIGHTLY
Qty: 90 TABLET | Refills: 3 | Status: SHIPPED | OUTPATIENT
Start: 2023-01-19

## 2023-01-19 NOTE — PROGRESS NOTES
[facility] [place]  [facility] CARDIOLOGY  [address] [place] [address]  Dept: [phone]  Dept Fax: [phone]    Eloise Cheng  1955    Follow Up Office Visit Note    History of Present Illness:  Eloise Cheng is a 67 y.o. female who presents to the clinic for Follow-up and carotid stenosis. - She underwent carotid doppler and the LICA has mild plaques, non obstructive, the TRIP is occluded, denies any chest pain SOB<     The following portions of the patient's history were reviewed and updated as appropriate: allergies, current medications, past family history, past medical history, past social history, past surgical history, and problem list.    Medications:  aspirin  atenolol  baclofen  furosemide  lisinopril  Nitrostat sublingual tablet  omeprazole  Praluent solution auto-injector  Rivaroxaban  rosuvastatin    Subjective  Allergies   Allergen Reactions   • Tape Other (See Comments)     Paper tape / water welps   • Cephalexin GI Intolerance   • Codeine GI Intolerance   • Prednisone GI Intolerance        Past Medical History:   Diagnosis Date   • Abdominal aortic aneurysm (AAA) 2010   • Cancer ([facility]) 1994    cervix   • Carotid artery stenosis    • Chest pain    • Chronic back pain    • Coarctation of infrarenal abdominal aorta    • Coronary artery disease    • Depression    • Diabetes ([facility])    • Disease of artery ([facility])    • Disease of thyroid gland    • Dyslipidemia    • GERD (gastroesophageal reflux disease)    • Hepatic steatosis    • Hiatal hernia    • Hyperlipidemia    • Hypertension    • Obesity        Past Surgical History:   Procedure Laterality Date   • AORTAGRAM  02/2014   • ARTERIAL ANEURYSM REPAIR  2014    AAA endograft   • BREAST MASS EXCISION  2003   • CAROTID ENDARTERECTOMY Right 07/2012   • COLONOSCOPY N/A    • WI TCAT IV STENT CRV CRTD ART EMBOLIC PROTECJ N/A 6/16/2016    Left Carotid Stent;  Surgeon: Tony Fry MD;  Location: [facility]   • SHOULDER  "SURGERY      clavicular fx repair   • TONSILLECTOMY     • TUBAL ABDOMINAL LIGATION Bilateral        Family History   Problem Relation Age of Onset   • Coronary artery disease Mother 42   • Hypertension Father    • Coronary artery disease Father    • Hypertension Brother    • Coronary artery disease Brother         CABG   • Diabetes Other    • Suicidality Other         Social History     Socioeconomic History   • Marital status:    Tobacco Use   • Smoking status: Former     Packs/day: 1.00     Types: Cigarettes     Quit date:      Years since quittin.0   • Smokeless tobacco: Never   • Tobacco comments:     quit 8 years ago   Vaping Use   • Vaping Use: Never used   Substance and Sexual Activity   • Alcohol use: No   • Drug use: No   • Sexual activity: Defer       Review of Systems   Constitutional: Negative.    HENT: Negative.    Respiratory: Negative.    Cardiovascular: Negative.    Endocrine: Negative.    Genitourinary: Negative.    Musculoskeletal: Negative.    Skin: Negative.    Allergic/Immunologic: Negative.    Neurological: Negative.    Hematological: Negative.    Psychiatric/Behavioral: Negative.        Cardiovascular Procedures    ECHO/MUGA:  STRESS TESTS:   CARDIAC CATH:   DEVICES:   HOLTER:   CT/MRI:   VASCULAR:   CARDIOTHORACIC:     Objective  Vitals:    23 1109   BP: 124/62   BP Location: Right arm   Patient Position: Sitting   Cuff Size: Large Adult   Pulse: 63   Resp: 18   Temp: 97.2 °F (36.2 °C)   TempSrc: Infrared   SpO2: 95%   Weight: 97.1 kg (214 lb)   Height: 160 cm (63\")   PainSc:   6   PainLoc: Back     Body mass index is 37.91 kg/m².     Physical Exam  Constitutional:       Appearance: Healthy appearance. Not in distress.   Neck:      Vascular: No JVR. JVD normal.   Pulmonary:      Effort: Pulmonary effort is normal.      Breath sounds: Normal breath sounds. No wheezing. No rhonchi. No rales.   Chest:      Chest wall: Not tender to palpatation.   Cardiovascular:      " PMI at left midclavicular line. Normal rate. Regular rhythm. Normal S1. Normal S2.      Murmurs: There is no murmur.      No gallop. No click. No rub.   Pulses:     Intact distal pulses.   Edema:     Peripheral edema absent.   Abdominal:      General: Bowel sounds are normal.      Palpations: Abdomen is soft.      Tenderness: There is no abdominal tenderness.   Musculoskeletal: Normal range of motion.         General: No tenderness. Skin:     General: Skin is warm and dry.   Neurological:      General: No focal deficit present.      Mental Status: Alert and oriented to person, place and time.          Diagnostic Data    ECG 12 Lead    Date/Time: 1/19/2023 11:53 AM  Performed by: Benson Scott MD  Authorized by: Benson Scott MD   Comparison: compared with previous ECG from 4/25/2022  Similar to previous ECG  Rhythm: sinus rhythm and sinus bradycardia  Rate: bradycardic  BPM: 55  QRS axis: normal    Clinical impression: normal ECG            Assessment and Plan  Diagnoses and all orders for this visit:    Infrarenal abdominal aortic aneurysm (AAA) without rupture- she is s/p surgery endovascular,    Asymptomatic PVD (peripheral vascular disease) ([facility]CC)on Asa and xarelto 2,5 bid,     Carotid stenosis, bilateral- has 100% TRIP and Luminal in the LICA by doppler done today  -     Rivaroxaban (Xarelto) 2.5 MG tablet; Take 1 tablet by mouth 2 (Two) Times a Day.    Essential hypertension- BP is 110.60., On Lisinopril 40 mg, Atenolol 50 mg, Lasix 20 mg   -     atenolol (TENORMIN) 50 MG tablet; Take 1 tablet by mouth Daily.  -     lisinopril (PRINIVIL,ZESTRIL) 40 MG tablet; Take 1 tablet by mouth every night at bedtime.    Hyperlipidemia LDL goal <70-On Praluent and also Crestor 20 mg,   -     rosuvastatin (CRESTOR) 20 MG tablet; Take 1 tablet by mouth Every Night.    Shortness of breath- Cath mild disease, Likely non cardiac  -     furosemide (LASIX) 20 MG tablet; Take 1 tablet by mouth Daily.  CAD- Mild  disease by cath         Return in about 6 months (around 7/19/2023) for Recheck with Dr. Scott.    Benson Scott MD  01/19/2023

## 2023-05-19 ENCOUNTER — TELEPHONE (OUTPATIENT)
Dept: CARDIOLOGY | Facility: CLINIC | Age: 68
End: 2023-05-19
Payer: MEDICARE

## 2023-05-19 NOTE — TELEPHONE ENCOUNTER
xarelto 2.5mg  Additional Information Required  Member has an active PA on file which is expiring on 12/31/2039.

## 2023-09-06 DIAGNOSIS — I65.21 STENOSIS OF RIGHT CAROTID ARTERY: ICD-10-CM

## 2023-09-06 RX ORDER — ALIROCUMAB 75 MG/ML
75 INJECTION, SOLUTION SUBCUTANEOUS
Qty: 6 ML | Refills: 3 | Status: SHIPPED | OUTPATIENT
Start: 2023-09-06

## 2023-09-06 NOTE — TELEPHONE ENCOUNTER
Caller: Cheng Eloise S    Relationship: Self    Best call back number: [phone]    Requested Prescriptions:   Requested Prescriptions     Pending Prescriptions Disp Refills    Alirocumab (Praluent) 75 MG/ML solution auto-injector 6 mL 3     Sig: Inject 1 mL under the skin into the appropriate area as directed Every 14 (Fourteen) Days.        Pharmacy where request should be sent: [id number] - [place], [place] - [address] AT [facility] [phone] [facility] [phone] FX     Last office visit with prescribing clinician: 7/20/2023   Last telemedicine visit with prescribing clinician: Visit date not found   Next office visit with prescribing clinician: 1/18/2024         Does the patient have less than a 3 day supply:  [] Yes  [x] No    Would you like a call back once the refill request has been completed: [] Yes [x] No    If the office needs to give you a call back, can they leave a voicemail: [] Yes [x] No    Edilberto Klein Rep   09/06/23 13:48 EDT

## 2023-09-27 ENCOUNTER — TELEPHONE (OUTPATIENT)
Dept: CARDIOLOGY | Facility: CLINIC | Age: 68
End: 2023-09-27
Payer: MEDICARE

## 2024-03-04 ENCOUNTER — OFFICE VISIT (OUTPATIENT)
Dept: CARDIOLOGY | Facility: CLINIC | Age: 69
End: 2024-03-04
Payer: MEDICARE

## 2024-03-04 VITALS
HEIGHT: 63 IN | HEART RATE: 66 BPM | RESPIRATION RATE: 18 BRPM | SYSTOLIC BLOOD PRESSURE: 118 MMHG | BODY MASS INDEX: 37.21 KG/M2 | WEIGHT: 210 LBS | OXYGEN SATURATION: 92 % | DIASTOLIC BLOOD PRESSURE: 66 MMHG

## 2024-03-04 DIAGNOSIS — I71.43 INFRARENAL ABDOMINAL AORTIC ANEURYSM (AAA) WITHOUT RUPTURE: ICD-10-CM

## 2024-03-04 DIAGNOSIS — R06.02 SHORTNESS OF BREATH: ICD-10-CM

## 2024-03-04 DIAGNOSIS — I65.23 CAROTID STENOSIS, BILATERAL: ICD-10-CM

## 2024-03-04 DIAGNOSIS — E78.5 HYPERLIPIDEMIA LDL GOAL <70: ICD-10-CM

## 2024-03-04 DIAGNOSIS — I73.9 ASYMPTOMATIC PVD (PERIPHERAL VASCULAR DISEASE): ICD-10-CM

## 2024-03-04 DIAGNOSIS — I10 ESSENTIAL HYPERTENSION: ICD-10-CM

## 2024-03-04 DIAGNOSIS — I25.10 CORONARY ARTERY DISEASE INVOLVING NATIVE CORONARY ARTERY OF NATIVE HEART WITHOUT ANGINA PECTORIS: Primary | ICD-10-CM

## 2024-03-04 PROCEDURE — 1160F RVW MEDS BY RX/DR IN RCRD: CPT | Performed by: INTERNAL MEDICINE

## 2024-03-04 PROCEDURE — 3078F DIAST BP <80 MM HG: CPT | Performed by: INTERNAL MEDICINE

## 2024-03-04 PROCEDURE — 3074F SYST BP LT 130 MM HG: CPT | Performed by: INTERNAL MEDICINE

## 2024-03-04 PROCEDURE — 99214 OFFICE O/P EST MOD 30 MIN: CPT | Performed by: INTERNAL MEDICINE

## 2024-03-04 PROCEDURE — 1159F MED LIST DOCD IN RCRD: CPT | Performed by: INTERNAL MEDICINE

## 2024-03-04 PROCEDURE — 93000 ELECTROCARDIOGRAM COMPLETE: CPT | Performed by: INTERNAL MEDICINE

## 2024-03-05 ENCOUNTER — TELEPHONE (OUTPATIENT)
Dept: CARDIOLOGY | Facility: CLINIC | Age: 69
End: 2024-03-05
Payer: MEDICARE

## 2024-03-05 LAB
ALBUMIN SERPL-MCNC: 4.4 G/DL (ref 3.9–4.9)
ALBUMIN/GLOB SERPL: 2.2 {RATIO} (ref 1.2–2.2)
ALP SERPL-CCNC: 77 IU/L (ref 44–121)
ALT SERPL-CCNC: 16 IU/L (ref 0–32)
AST SERPL-CCNC: 23 IU/L (ref 0–40)
BASOPHILS # BLD AUTO: 0.1 X10E3/UL (ref 0–0.2)
BASOPHILS NFR BLD AUTO: 1 %
BILIRUB SERPL-MCNC: 0.6 MG/DL (ref 0–1.2)
BUN SERPL-MCNC: 12 MG/DL (ref 8–27)
BUN/CREAT SERPL: 12 (ref 12–28)
CALCIUM SERPL-MCNC: 9.2 MG/DL (ref 8.7–10.3)
CHLORIDE SERPL-SCNC: 103 MMOL/L (ref 96–106)
CK SERPL-CCNC: 55 U/L (ref 32–182)
CO2 SERPL-SCNC: 29 MMOL/L (ref 20–29)
CREAT SERPL-MCNC: 1.04 MG/DL (ref 0.57–1)
EGFRCR SERPLBLD CKD-EPI 2021: 59 ML/MIN/1.73
EOSINOPHIL # BLD AUTO: 0.3 X10E3/UL (ref 0–0.4)
EOSINOPHIL NFR BLD AUTO: 4 %
ERYTHROCYTE [DISTWIDTH] IN BLOOD BY AUTOMATED COUNT: 14.3 % (ref 11.7–15.4)
GLOBULIN SER CALC-MCNC: 2 G/DL (ref 1.5–4.5)
GLUCOSE SERPL-MCNC: 110 MG/DL (ref 70–99)
HCT VFR BLD AUTO: 43.3 % (ref 34–46.6)
HGB BLD-MCNC: 13.5 G/DL (ref 11.1–15.9)
IMM GRANULOCYTES # BLD AUTO: 0 X10E3/UL (ref 0–0.1)
IMM GRANULOCYTES NFR BLD AUTO: 0 %
LYMPHOCYTES # BLD AUTO: 1.1 X10E3/UL (ref 0.7–3.1)
LYMPHOCYTES NFR BLD AUTO: 13 %
MCH RBC QN AUTO: 24.3 PG (ref 26.6–33)
MCHC RBC AUTO-ENTMCNC: 31.2 G/DL (ref 31.5–35.7)
MCV RBC AUTO: 78 FL (ref 79–97)
MONOCYTES # BLD AUTO: 0.4 X10E3/UL (ref 0.1–0.9)
MONOCYTES NFR BLD AUTO: 5 %
NEUTROPHILS # BLD AUTO: 6.2 X10E3/UL (ref 1.4–7)
NEUTROPHILS NFR BLD AUTO: 77 %
NT-PROBNP SERPL-MCNC: 166 PG/ML (ref 0–301)
PLATELET # BLD AUTO: 462 X10E3/UL (ref 150–450)
POTASSIUM SERPL-SCNC: 5.3 MMOL/L (ref 3.5–5.2)
PROT SERPL-MCNC: 6.4 G/DL (ref 6–8.5)
RBC # BLD AUTO: 5.56 X10E6/UL (ref 3.77–5.28)
SODIUM SERPL-SCNC: 145 MMOL/L (ref 134–144)
WBC # BLD AUTO: 8.2 X10E3/UL (ref 3.4–10.8)

## 2024-03-05 NOTE — PROGRESS NOTES
[facility] [place]  [facility] CARDIOLOGY  [address] [place] [address]  Dept: [phone]  Dept Fax: [phone]    Eloise Cheng  1955    Follow Up Office Visit Note    History of Present Illness:  Eloise Cheng is a 68 y.o. female who presents to the clinic for Follow-up. Carotid stenosis - She has 100% TRIP, and Luminal in the LICA on ASA and Xarelto 2,5 mg no complaints BP is 125.70    The following portions of the patient's history were reviewed and updated as appropriate: allergies, current medications, past family history, past medical history, past social history, past surgical history, and problem list.    Medications:  aspirin  atenolol  baclofen  furosemide  lisinopril  Nitrostat sublingual tablet  omeprazole  Praluent solution auto-injector  Rivaroxaban  rosuvastatin    Subjective  Allergies   Allergen Reactions    Tape Other (See Comments)     Paper tape / water welps    Cephalexin GI Intolerance    Codeine GI Intolerance    Prednisone GI Intolerance        Past Medical History:   Diagnosis Date    Abdominal aortic aneurysm (AAA) 2010    Cancer 1994    cervix    Carotid artery stenosis     Chest pain     Chronic back pain     Coarctation of infrarenal abdominal aorta     Coronary artery disease     Depression     Diabetes     Disease of artery     Disease of thyroid gland     Dyslipidemia     GERD (gastroesophageal reflux disease)     Hepatic steatosis     Hiatal hernia     Hyperlipidemia     Hypertension     Obesity        Past Surgical History:   Procedure Laterality Date    AORTAGRAM  02/2014    ARTERIAL ANEURYSM REPAIR  2014    AAA endograft    BREAST MASS EXCISION  2003    CAROTID ENDARTERECTOMY Right 07/2012    COLONOSCOPY N/A     IN TCAT IV STENT CRV CRTD ART EMBOLIC PROTECJ N/A 6/16/2016    Left Carotid Stent;  Surgeon: Tony Fry MD;  Location: [facility]    SHOULDER SURGERY      clavicular fx repair    TONSILLECTOMY  1966    TUBAL ABDOMINAL LIGATION  "Bilateral        Family History   Problem Relation Age of Onset    Coronary artery disease Mother 42    Hypertension Father     Coronary artery disease Father     Hypertension Brother     Coronary artery disease Brother         CABG    Diabetes Other     Suicidality Other         Social History     Socioeconomic History    Marital status:    Tobacco Use    Smoking status: Former     Current packs/day: 0.00     Types: Cigarettes     Quit date:      Years since quittin.1    Smokeless tobacco: Never    Tobacco comments:     quit 8 years ago   Vaping Use    Vaping status: Never Used   Substance and Sexual Activity    Alcohol use: No    Drug use: No    Sexual activity: Defer       Review of Systems   Constitutional: Negative.    HENT: Negative.     Respiratory: Negative.     Cardiovascular: Negative.    Endocrine: Negative.    Genitourinary: Negative.    Musculoskeletal: Negative.    Skin: Negative.    Allergic/Immunologic: Negative.    Neurological: Negative.    Hematological: Negative.    Psychiatric/Behavioral: Negative.         Cardiovascular Procedures    ECHO/MUGA:  STRESS TESTS:   CARDIAC CATH:   DEVICES:   HOLTER:   CT/MRI:   VASCULAR:   CARDIOTHORACIC:     Objective  Vitals:    24 1056   BP: 118/66   BP Location: Right arm   Patient Position: Lying   Cuff Size: Large Adult   Pulse: 66   Resp: 18   SpO2: 92%   Weight: 95.3 kg (210 lb)   Height: 160 cm (63\")   PainSc: 0-No pain     Body mass index is 37.2 kg/m².     Physical Exam  Vitals reviewed.   Constitutional:       Appearance: Healthy appearance. Not in distress.   Neck:      Vascular: No JVR. JVD normal.   Pulmonary:      Effort: Pulmonary effort is normal.      Breath sounds: Normal breath sounds. No wheezing. No rhonchi. No rales.   Chest:      Chest wall: Not tender to palpatation.   Cardiovascular:      PMI at left midclavicular line. Normal rate. Regular rhythm. Normal S1. Normal S2.       Murmurs: There is no murmur.      No " gallop.  No click. No rub.   Pulses:     Intact distal pulses.   Edema:     Peripheral edema absent.   Abdominal:      General: Bowel sounds are normal.      Palpations: Abdomen is soft.      Tenderness: There is no abdominal tenderness.   Musculoskeletal: Normal range of motion.         General: No tenderness. Skin:     General: Skin is warm and dry.   Neurological:      General: No focal deficit present.      Mental Status: Alert and oriented to person, place and time.        Diagnostic Data    ECG 12 Lead    Date/Time: 3/4/2024 10:23 PM  Performed by: Benson Scott MD    Authorized by: Benson Scott MD  Comparison: compared with previous ECG from 7/20/2023  Similar to previous ECG  Rhythm: sinus rhythm  Rate: normal  BPM: 60  QRS axis: normal    Clinical impression: normal ECG        Assessment and Plan  Diagnoses and all orders for this visit:    Coronary artery disease involving native coronary artery of native heart without angina pectoris-mild disease by cath no complaints    Essential hypertension- BP is 125.70 on Lisinopril 40 mg, Atenolol 50 mg and lasix 20 mg, no complaints    Hyperlipidemia LDL goal <70- On Crestor 20 mg and Praluent    Carotid stenosis, bilateral- 100% TRIP and Luminal LICA on ASA and Xarelto 2,5 bid    Infrarenal abdominal aortic aneurysm (AAA) without rupture s/p endovascular treatment     Asymptomatic PVD (peripheral vascular disease)- on ASA and Xarelto 2,5  bid, prior stents to [place]     Shortness of breath- Likely from diastolic dysfunction, will get an echo, she is on Lasix 20 mg,  -     Adult Transthoracic Echo Complete W/ Cont if Necessary Per Protocol; Future  -     CBC & Differential  -     Comprehensive Metabolic Panel  -     CK  -     proBNP         Return in about 6 months (around 9/4/2024) for Recheck with Dr. Scott.    Benson Scott MD  03/04/2024

## 2024-03-05 NOTE — TELEPHONE ENCOUNTER
----- Message from Benson Scott MD sent at 3/5/2024 12:45 PM EST -----  Lab are good except K a little high, please be sure she is not taking potassium and also MVI with K

## 2024-03-05 NOTE — TELEPHONE ENCOUNTER
ECHO scheduled at [facility] in [address],Fri 3/8 8:00, 7:30 arrival. HUB to transfer call to the office.

## 2024-03-05 NOTE — TELEPHONE ENCOUNTER
Spoke with Ms. Cheng and went over recent lab work results.  Overall your labs are good but potassium level slightly elevated at 5.3.  She denies taking any K+ pill or supplements.  She understands for next few days eat a low K+ diet.

## 2024-04-24 DIAGNOSIS — I10 ESSENTIAL HYPERTENSION: ICD-10-CM

## 2024-04-24 RX ORDER — LISINOPRIL 40 MG/1
40 TABLET ORAL
Qty: 90 TABLET | Refills: 3 | Status: SHIPPED | OUTPATIENT
Start: 2024-04-24

## 2024-07-24 DIAGNOSIS — I65.23 CAROTID STENOSIS, BILATERAL: ICD-10-CM

## 2024-07-24 RX ORDER — RIVAROXABAN 2.5 MG/1
2.5 TABLET, FILM COATED ORAL 2 TIMES DAILY
Qty: 180 TABLET | Refills: 3 | Status: SHIPPED | OUTPATIENT
Start: 2024-07-24

## 2024-08-30 ENCOUNTER — TELEPHONE (OUTPATIENT)
Dept: CARDIOLOGY | Facility: CLINIC | Age: 69
End: 2024-08-30
Payer: MEDICARE

## 2024-09-23 DIAGNOSIS — I10 ESSENTIAL HYPERTENSION: ICD-10-CM

## 2024-09-23 DIAGNOSIS — E78.5 HYPERLIPIDEMIA LDL GOAL <70: ICD-10-CM

## 2024-09-23 DIAGNOSIS — R06.02 SHORTNESS OF BREATH: ICD-10-CM

## 2024-09-23 RX ORDER — FUROSEMIDE 20 MG
20 TABLET ORAL DAILY
Qty: 90 TABLET | Refills: 3 | Status: SHIPPED | OUTPATIENT
Start: 2024-09-23

## 2024-09-23 RX ORDER — ROSUVASTATIN CALCIUM 20 MG/1
20 TABLET, COATED ORAL NIGHTLY
Qty: 90 TABLET | Refills: 3 | Status: SHIPPED | OUTPATIENT
Start: 2024-09-23

## 2024-09-23 RX ORDER — ATENOLOL 50 MG/1
TABLET ORAL
Qty: 90 TABLET | Refills: 3 | Status: SHIPPED | OUTPATIENT
Start: 2024-09-23

## 2024-11-22 DIAGNOSIS — I65.21 STENOSIS OF RIGHT CAROTID ARTERY: ICD-10-CM

## 2024-11-22 RX ORDER — ALIROCUMAB 75 MG/ML
INJECTION, SOLUTION SUBCUTANEOUS
Qty: 1 ML | Refills: 0 | Status: SHIPPED | OUTPATIENT
Start: 2024-11-22

## 2024-12-24 DIAGNOSIS — I65.21 STENOSIS OF RIGHT CAROTID ARTERY: ICD-10-CM

## 2024-12-24 RX ORDER — ALIROCUMAB 75 MG/ML
INJECTION, SOLUTION SUBCUTANEOUS
Qty: 6 ML | Refills: 1 | Status: SHIPPED | OUTPATIENT
Start: 2024-12-24

## 2025-02-24 ENCOUNTER — OFFICE VISIT (OUTPATIENT)
Dept: CARDIOLOGY | Facility: CLINIC | Age: 70
End: 2025-02-24
Payer: MEDICARE

## 2025-02-24 VITALS
HEIGHT: 63 IN | RESPIRATION RATE: 18 BRPM | WEIGHT: 201 LBS | SYSTOLIC BLOOD PRESSURE: 110 MMHG | HEART RATE: 64 BPM | DIASTOLIC BLOOD PRESSURE: 68 MMHG | OXYGEN SATURATION: 99 % | BODY MASS INDEX: 35.61 KG/M2 | TEMPERATURE: 97 F

## 2025-02-24 DIAGNOSIS — I71.40 ABDOMINAL AORTIC ANEURYSM (AAA) WITHOUT RUPTURE, UNSPECIFIED PART: ICD-10-CM

## 2025-02-24 DIAGNOSIS — I10 ESSENTIAL HYPERTENSION: ICD-10-CM

## 2025-02-24 DIAGNOSIS — I65.23 CAROTID STENOSIS, BILATERAL: ICD-10-CM

## 2025-02-24 DIAGNOSIS — I73.9 ASYMPTOMATIC PVD (PERIPHERAL VASCULAR DISEASE): ICD-10-CM

## 2025-02-24 DIAGNOSIS — E78.5 HYPERLIPIDEMIA LDL GOAL <70: ICD-10-CM

## 2025-02-24 DIAGNOSIS — I25.10 CORONARY ARTERY DISEASE INVOLVING NATIVE CORONARY ARTERY OF NATIVE HEART WITHOUT ANGINA PECTORIS: Primary | ICD-10-CM

## 2025-02-24 DIAGNOSIS — I79.0 ANEURYSM OF AORTA IN DISEASES CLASSIFIED ELSEWHERE: ICD-10-CM

## 2025-02-24 PROCEDURE — 3078F DIAST BP <80 MM HG: CPT | Performed by: INTERNAL MEDICINE

## 2025-02-24 PROCEDURE — 1160F RVW MEDS BY RX/DR IN RCRD: CPT | Performed by: INTERNAL MEDICINE

## 2025-02-24 PROCEDURE — 93000 ELECTROCARDIOGRAM COMPLETE: CPT | Performed by: INTERNAL MEDICINE

## 2025-02-24 PROCEDURE — 1159F MED LIST DOCD IN RCRD: CPT | Performed by: INTERNAL MEDICINE

## 2025-02-24 PROCEDURE — 36415 COLL VENOUS BLD VENIPUNCTURE: CPT | Performed by: INTERNAL MEDICINE

## 2025-02-24 PROCEDURE — 99214 OFFICE O/P EST MOD 30 MIN: CPT | Performed by: INTERNAL MEDICINE

## 2025-02-24 PROCEDURE — 3074F SYST BP LT 130 MM HG: CPT | Performed by: INTERNAL MEDICINE

## 2025-02-24 NOTE — PROGRESS NOTES
[facility] [place]  [facility] CARDIOLOGY  [address] [place] [address]  Dept: [phone]  Dept Fax: [phone]    Eloise Cheng  1955    Follow Up Office Visit Note    History of Present Illness:  Eloise Cheng is a 69 y.o. female who presents to the clinic for Follow-up.Shortness of breath- She has been SOB, more and more, she was at the hospital early this month with the flu, has some cough, no edema, she did have an echo there last year with normal Ef but E/e` pressure was 13. However apparently no diastolic dysfunction, her BP here is 150.80 she is surprise usually is normal, she takes Lisinopril 40 mg, lasix 20 mg, Atenolol 50 mg,  she a;so has carotid stenosis 100% TRIP ad has prior stents in 2016 LICA  and carotid doppler 2023 patent stents. She also has endovascular treatment for AAA 2017, , will get a carotid doppler and also a CTA for reevaluation    The following portions of the patient's history were reviewed and updated as appropriate: allergies, current medications, past family history, past medical history, past social history, past surgical history, and problem list.    Medications:  aspirin  atenolol  baclofen  furosemide  lisinopril  Nitrostat sublingual tablet  omeprazole  Praluent solution auto-injector  rosuvastatin  Xarelto tablet    Subjective  Allergies   Allergen Reactions    Tape Other (See Comments)     Paper tape / water welps    Cephalexin GI Intolerance    Codeine GI Intolerance    Prednisone GI Intolerance        Past Medical History:   Diagnosis Date    Abdominal aortic aneurysm (AAA) 2010    Cancer 1994    cervix    Carotid artery stenosis     Chest pain     Chronic back pain     Coarctation of infrarenal abdominal aorta     Coronary artery disease     Depression     Diabetes     Disease of artery     Disease of thyroid gland     Dyslipidemia     GERD (gastroesophageal reflux disease)     Hepatic steatosis     Hiatal hernia     Hyperlipidemia  "    Hypertension     Obesity        Past Surgical History:   Procedure Laterality Date    AORTAGRAM  2014    ARTERIAL ANEURYSM REPAIR  2014    AAA endograft    BREAST MASS EXCISION  2003    CAROTID ENDARTERECTOMY Right 2012    COLONOSCOPY N/A     TX TCAT IV STENT CRV CRTD ART EMBOLIC PROTECJ N/A 2016    Left Carotid Stent;  Surgeon: Tony Fry MD;  Location: [facility]    SHOULDER SURGERY      clavicular fx repair    TONSILLECTOMY  1966    TUBAL ABDOMINAL LIGATION Bilateral        Family History   Problem Relation Age of Onset    Coronary artery disease Mother 42    Hypertension Father     Coronary artery disease Father     Hypertension Brother     Coronary artery disease Brother         CABG    Diabetes Other     Suicidality Other         Social History     Socioeconomic History    Marital status:    Tobacco Use    Smoking status: Former     Current packs/day: 0.00     Types: Cigarettes     Quit date:      Years since quittin.1    Smokeless tobacco: Never    Tobacco comments:     quit 8 years ago   Vaping Use    Vaping status: Never Used   Substance and Sexual Activity    Alcohol use: No    Drug use: No    Sexual activity: Defer       Review of Systems   Constitutional: Negative.    HENT: Negative.     Respiratory:  Positive for shortness of breath.    Cardiovascular: Negative.    Endocrine: Negative.    Genitourinary: Negative.    Musculoskeletal: Negative.    Skin: Negative.    Allergic/Immunologic: Negative.    Neurological: Negative.    Hematological: Negative.    Psychiatric/Behavioral: Negative.         Cardiovascular Procedures    ECHO/MUGA:  STRESS TESTS:   CARDIAC CATH:   DEVICES:   HOLTER:   CT/MRI:   VASCULAR:   CARDIOTHORACIC:     Objective  Vitals:    25 1020   BP: 110/68   BP Location: Right arm   Patient Position: Lying   Cuff Size: Adult   Pulse: 64   Resp: 18   Temp: 97 °F (36.1 °C)   TempSrc: Infrared   SpO2: 99%   Weight: 91.2 kg (201 lb)   Height: 160 cm (63\") "   PainSc: 0-No pain     Body mass index is 35.61 kg/m².     Physical Exam  Vitals reviewed.   Constitutional:       Appearance: Healthy appearance. Not in distress.   Neck:      Vascular: No JVR. JVD normal.   Pulmonary:      Effort: Pulmonary effort is normal.      Breath sounds: Normal breath sounds. No wheezing. No rhonchi. No rales.   Chest:      Chest wall: Not tender to palpatation.   Cardiovascular:      PMI at left midclavicular line. Normal rate. Regular rhythm. Normal S1. Normal S2.       Murmurs: There is no murmur.      No gallop.  No click. No rub.   Pulses:     Intact distal pulses.   Edema:     Peripheral edema absent.   Abdominal:      General: Bowel sounds are normal.      Palpations: Abdomen is soft.      Tenderness: There is no abdominal tenderness.   Musculoskeletal: Normal range of motion.         General: No tenderness. Skin:     General: Skin is warm and dry.   Neurological:      General: No focal deficit present.      Mental Status: Alert and oriented to person, place and time.        Diagnostic Data    ECG 12 Lead    Date/Time: 2/24/2025 10:57 AM  Performed by: Benson Scott MD    Authorized by: Benson Scott MD  Comparison: compared with previous ECG from 3/4/2024  Similar to previous ECG  Rhythm: sinus rhythm  Rate: normal  BPM: 60  QRS axis: normal    Clinical impression: normal ECG        Assessment and Plan  Diagnoses and all orders for this visit:    Coronary artery disease involving native coronary artery of native heart without angina pectoris- Mild disease by cath in the past no CP, but SOB, she used to smoke, but does not have dx of COPD    Essential hypertension-- BP is 150.80, advised to check  BP at home., keep Lasix 20 mg, Lisinopril 40 mg and Atenolol 50 mg, might need Aldactone     Hyperlipidemia LDL goal <70- On Praluent and Repatha, tolerates well will get a Lipid profile  -     Lipid Panel  -     Troponin T  -     TSH  -     Comprehensive Metabolic  Panel  -     CK    Carotid stenosis, bilateral- will get a carotid doppler has 100% TRIP and   prior stent on the LICA in 2016 and last doppler 2023 patent stents   -     Duplex Carotid Ultrasound CAR; Future    Abdominal aortic aneurysm (AAA) without rupture, unspecified part s.p endovascular treatment by Dr Shannon, will get a CTA to reevaluate it  -     CT Angio Abdominal Aorta Bilateral Iliofem Runoff With & Without Contrast; Future    Asymptomatic PVD (peripheral vascular disease)    Aneurysm of aorta in diseases classified elsewhere  -     CT Angio Abdominal Aorta Bilateral Iliofem Runoff With & Without Contrast; Future  -     Apolipoprotein B  -     Lipoprotein A (LPA)  -     High Sensitivity CRP  -     proBNP  -     Basic Metabolic Panel  -     CBC & Differential  -     Lipid Panel  -     Troponin T         Return in about 1 year (around 2/24/2026) for Recheck with Dr. Scott.    Benson Scott MD  02/24/2025

## 2025-02-24 NOTE — PROGRESS NOTES
Venipuncture Blood Specimen Collection  Venipuncture performed in clinic by Esperanza Gomez RN with good hemostasis. Patient tolerated the procedure well without complications.   02/24/25   Esperanza Gomez RN

## 2025-02-25 LAB
ALBUMIN SERPL-MCNC: 4.2 G/DL (ref 3.9–4.9)
ALP SERPL-CCNC: 67 IU/L (ref 44–121)
ALT SERPL-CCNC: 16 IU/L (ref 0–32)
AST SERPL-CCNC: 19 IU/L (ref 0–40)
BASOPHILS # BLD AUTO: 0.1 X10E3/UL (ref 0–0.2)
BASOPHILS NFR BLD AUTO: 1 %
BILIRUB SERPL-MCNC: 0.6 MG/DL (ref 0–1.2)
BUN SERPL-MCNC: 12 MG/DL (ref 8–27)
BUN/CREAT SERPL: 12 (ref 12–28)
CALCIUM SERPL-MCNC: 9.7 MG/DL (ref 8.7–10.3)
CHLORIDE SERPL-SCNC: 100 MMOL/L (ref 96–106)
CHOLEST SERPL-MCNC: 115 MG/DL (ref 100–199)
CK SERPL-CCNC: 38 U/L (ref 32–182)
CO2 SERPL-SCNC: 32 MMOL/L (ref 20–29)
CREAT SERPL-MCNC: 0.98 MG/DL (ref 0.57–1)
CRP SERPL HS-MCNC: 0.65 MG/L (ref 0–3)
EGFRCR SERPLBLD CKD-EPI 2021: 63 ML/MIN/1.73
EOSINOPHIL # BLD AUTO: 0.2 X10E3/UL (ref 0–0.4)
EOSINOPHIL NFR BLD AUTO: 3 %
ERYTHROCYTE [DISTWIDTH] IN BLOOD BY AUTOMATED COUNT: 16.1 % (ref 11.7–15.4)
GLOBULIN SER CALC-MCNC: 2.1 G/DL (ref 1.5–4.5)
GLUCOSE SERPL-MCNC: 81 MG/DL (ref 70–99)
HCT VFR BLD AUTO: 43.7 % (ref 34–46.6)
HDLC SERPL-MCNC: 48 MG/DL
HGB BLD-MCNC: 13.2 G/DL (ref 11.1–15.9)
IMM GRANULOCYTES # BLD AUTO: 0 X10E3/UL (ref 0–0.1)
IMM GRANULOCYTES NFR BLD AUTO: 0 %
LDLC SERPL CALC-MCNC: 44 MG/DL (ref 0–99)
LPA SERPL-SCNC: 155.5 NMOL/L
LYMPHOCYTES # BLD AUTO: 0.8 X10E3/UL (ref 0.7–3.1)
LYMPHOCYTES NFR BLD AUTO: 13 %
MCH RBC QN AUTO: 23.2 PG (ref 26.6–33)
MCHC RBC AUTO-ENTMCNC: 30.2 G/DL (ref 31.5–35.7)
MCV RBC AUTO: 77 FL (ref 79–97)
MONOCYTES # BLD AUTO: 0.4 X10E3/UL (ref 0.1–0.9)
MONOCYTES NFR BLD AUTO: 6 %
NEUTROPHILS # BLD AUTO: 4.7 X10E3/UL (ref 1.4–7)
NEUTROPHILS NFR BLD AUTO: 77 %
NT-PROBNP SERPL-MCNC: 221 PG/ML (ref 0–301)
PLATELET # BLD AUTO: 426 X10E3/UL (ref 150–450)
POTASSIUM SERPL-SCNC: 4.4 MMOL/L (ref 3.5–5.2)
PROT SERPL-MCNC: 6.3 G/DL (ref 6–8.5)
RBC # BLD AUTO: 5.7 X10E6/UL (ref 3.77–5.28)
SODIUM SERPL-SCNC: 144 MMOL/L (ref 134–144)
TRIGL SERPL-MCNC: 131 MG/DL (ref 0–149)
TROPONIN T SERPL HS-MCNC: 11 NG/L (ref 0–14)
TSH SERPL DL<=0.005 MIU/L-ACNC: 3.3 UIU/ML (ref 0.45–4.5)
VLDLC SERPL CALC-MCNC: 23 MG/DL (ref 5–40)
WBC # BLD AUTO: 6.1 X10E3/UL (ref 3.4–10.8)

## 2025-02-26 ENCOUNTER — TELEPHONE (OUTPATIENT)
Dept: CARDIOLOGY | Facility: CLINIC | Age: 70
End: 2025-02-26
Payer: MEDICARE

## 2025-02-26 LAB — APO B SERPL-MCNC: 51 MG/DL

## 2025-02-26 NOTE — TELEPHONE ENCOUNTER
----- Message from Esperanza GUZMAN sent at 2/26/2025  1:52 PM EST -----  The Lpa is very high she is already on repatha, we do not have prior lpa to compare., LDL is good 44.  Continue your medications for now.  ----- Message -----  From: Benson Scott MD  Sent: 2/25/2025   4:46 PM EST  To: Esperanza Gomez RN    The Lpa is very high she is already on repatha, we do not have prior lpa to compare., LDL is good 44

## 2025-03-19 ENCOUNTER — TELEPHONE (OUTPATIENT)
Dept: CARDIOLOGY | Facility: CLINIC | Age: 70
End: 2025-03-19
Payer: MEDICARE

## 2025-03-19 NOTE — TELEPHONE ENCOUNTER
[facility] called - requesting us to send the creatine order (has been more than 30 days)      Please fax lab order to - [phone]

## 2025-03-20 ENCOUNTER — RESULTS FOLLOW-UP (OUTPATIENT)
Dept: CARDIOLOGY | Facility: CLINIC | Age: 70
End: 2025-03-20
Payer: MEDICARE

## 2025-03-20 NOTE — TELEPHONE ENCOUNTER
The CTA indicates that your graft is working well, no major blockages on your legs, but you have fatty liver and maybe cirrhosis and the spleen is enlarged, please see and discuss with your PCP ASAP.    She verbalized understanding and sees a new PCP next Thursday.  I advised I would fax everything over to them tomorrow.  She was told back in 2017 that she had a fatty liver but never told anything about an enlarged spleen.  States she is not bleeding from anywhere.      NEW PCP is Katlin Mcelroy [phone]

## 2025-04-29 DIAGNOSIS — I10 ESSENTIAL HYPERTENSION: ICD-10-CM

## 2025-04-29 RX ORDER — LISINOPRIL 40 MG/1
40 TABLET ORAL
Qty: 90 TABLET | Refills: 4 | Status: SHIPPED | OUTPATIENT
Start: 2025-04-29

## 2025-06-13 DIAGNOSIS — I65.21 STENOSIS OF RIGHT CAROTID ARTERY: ICD-10-CM

## 2025-06-13 RX ORDER — ALIROCUMAB 75 MG/ML
INJECTION, SOLUTION SUBCUTANEOUS
Qty: 6 ML | Refills: 1 | Status: SHIPPED | OUTPATIENT
Start: 2025-06-13

## 2025-06-13 NOTE — TELEPHONE ENCOUNTER
Rx Refill Note  Requested Prescriptions     Pending Prescriptions Disp Refills    Praluent 75 MG/ML solution auto-injector [Pharmacy Med Name: PRALUENT 75MG/ML PF PEN INJ 2X1ML] 6 mL 1     Sig: INJECT 1ML UNDER THE SKIN EVERY 14 DAYS      Last office visit with prescribing clinician: 2/24/2025   Last telemedicine visit with prescribing clinician: Visit date not found   Next office visit with prescribing clinician: 2/23/2026                         Would you like a call back once the refill request has been completed: [] Yes [] No    If the office needs to give you a call back, can they leave a voicemail: [] Yes [] No    Crista Nguyen MA  06/13/25, 14:02 EDT

## 2025-06-20 DIAGNOSIS — E78.5 HYPERLIPIDEMIA LDL GOAL <70: ICD-10-CM

## 2025-06-20 RX ORDER — ROSUVASTATIN CALCIUM 20 MG/1
20 TABLET, COATED ORAL NIGHTLY
Qty: 90 TABLET | Refills: 3 | Status: SHIPPED | OUTPATIENT
Start: 2025-06-20